# Patient Record
Sex: FEMALE | Race: WHITE | NOT HISPANIC OR LATINO | Employment: OTHER | URBAN - METROPOLITAN AREA
[De-identification: names, ages, dates, MRNs, and addresses within clinical notes are randomized per-mention and may not be internally consistent; named-entity substitution may affect disease eponyms.]

---

## 2018-08-06 DIAGNOSIS — E11.9 TYPE 2 DIABETES MELLITUS WITHOUT COMPLICATION, WITHOUT LONG-TERM CURRENT USE OF INSULIN (HCC): Primary | ICD-10-CM

## 2018-08-07 RX ORDER — DAPAGLIFLOZIN 10 MG/1
TABLET, FILM COATED ORAL
Qty: 30 TABLET | Refills: 3 | Status: SHIPPED | OUTPATIENT
Start: 2018-08-07

## 2018-08-21 ENCOUNTER — OFFICE VISIT (OUTPATIENT)
Dept: FAMILY MEDICINE CLINIC | Facility: CLINIC | Age: 48
End: 2018-08-21
Payer: COMMERCIAL

## 2018-08-21 VITALS
DIASTOLIC BLOOD PRESSURE: 76 MMHG | RESPIRATION RATE: 16 BRPM | HEIGHT: 60 IN | OXYGEN SATURATION: 100 % | TEMPERATURE: 98.2 F | BODY MASS INDEX: 31.53 KG/M2 | HEART RATE: 82 BPM | SYSTOLIC BLOOD PRESSURE: 116 MMHG | WEIGHT: 160.6 LBS

## 2018-08-21 DIAGNOSIS — E11.9 TYPE 2 DIABETES MELLITUS WITHOUT COMPLICATION, WITHOUT LONG-TERM CURRENT USE OF INSULIN (HCC): Primary | ICD-10-CM

## 2018-08-21 DIAGNOSIS — E03.8 OTHER SPECIFIED HYPOTHYROIDISM: ICD-10-CM

## 2018-08-21 DIAGNOSIS — E55.9 VITAMIN D INSUFFICIENCY: ICD-10-CM

## 2018-08-21 DIAGNOSIS — I10 ESSENTIAL HYPERTENSION: ICD-10-CM

## 2018-08-21 DIAGNOSIS — E78.49 OTHER HYPERLIPIDEMIA: ICD-10-CM

## 2018-08-21 PROCEDURE — 99214 OFFICE O/P EST MOD 30 MIN: CPT | Performed by: FAMILY MEDICINE

## 2018-08-21 RX ORDER — LEVOTHYROXINE SODIUM 0.07 MG/1
TABLET ORAL EVERY 24 HOURS
COMMUNITY
Start: 2018-02-20 | End: 2019-03-26 | Stop reason: SDUPTHER

## 2018-08-21 RX ORDER — SIMVASTATIN 20 MG
TABLET ORAL
COMMUNITY
Start: 2018-02-20 | End: 2019-05-11 | Stop reason: SDUPTHER

## 2018-08-21 RX ORDER — LISINOPRIL 10 MG/1
TABLET ORAL
COMMUNITY
Start: 2018-02-20 | End: 2019-04-12 | Stop reason: SDUPTHER

## 2018-08-21 RX ORDER — PAROXETINE HYDROCHLORIDE 20 MG/1
TABLET, FILM COATED ORAL
COMMUNITY
Start: 2018-02-20 | End: 2019-05-12 | Stop reason: SDUPTHER

## 2018-08-21 RX ORDER — ERGOCALCIFEROL (VITAMIN D2) 1250 MCG
CAPSULE ORAL
COMMUNITY
Start: 2018-03-21 | End: 2020-06-18 | Stop reason: SDUPTHER

## 2018-08-21 NOTE — PROGRESS NOTES
Assessment/Plan:     Diagnoses and all orders for this visit:    Type 2 diabetes mellitus without complication, without long-term current use of insulin (HCC)  Comments:   it was discussed about continue same medication and I will check hemoglobin A1c  I will consider adjusting her medications depend on the results  Orders:  -     Hemoglobin A1C; Future  -     Microalbumin / creatinine urine ratio    Other specified hypothyroidism  Comments:   continue same medication and we will continue to monitor  Orders:  -     TSH, 3rd generation with Free T4 reflex; Future    Essential hypertension  Comments:    Stable, continue same  Will continue to monitor  Orders:  -     CBC and differential; Future  -     Comprehensive metabolic panel; Future    Other hyperlipidemia  Comments:   continue same and we will continue to monitor  Orders:  -     Lipid Panel with Direct LDL reflex; Future    Vitamin D insufficiency  -     Vitamin D 25 hydroxy; Future    Other orders  -     econazole nitrate 1 % cream; Every 12 hours  -     ergocalciferol (ERGOCALCIFEROL) 80014 units capsule; take 1 capsule by oral route  every week  -     metFORMIN (GLUCOPHAGE) 1000 MG tablet; Every 12 hours  -     lisinopril (ZESTRIL) 10 mg tablet; Take 1 tablet by mouth daily  -     PARoxetine (PAXIL) 20 mg tablet; Take 1 tablet by mouth daily  -     levothyroxine (SYNTHROID) 75 mcg tablet; every 24 hours  -     simvastatin (ZOCOR) 20 mg tablet; Take 1 tablet by mouth at bedtime          There are no Patient Instructions on file for this visit  Return in about 6 months (around 2/21/2019)  Subjective:      Patient ID: Lorena Gómez is a 49 y o  female  Chief Complaint   Patient presents with    Hypertension     6 month f/u    Diabetes    Hyperlipidemia        She is here today for follow-up multiple medical problems  Denies any complain  She has been taking all her medications  She denies any side effects from her medications          The following portions of the patient's history were reviewed and updated as appropriate: allergies, current medications, past family history, past medical history, past social history, past surgical history and problem list     Review of Systems   Constitutional: Negative for chills and fever  HENT: Negative for trouble swallowing  Eyes: Negative for visual disturbance  Respiratory: Negative for cough and shortness of breath  Cardiovascular: Negative for chest pain, palpitations and leg swelling  Gastrointestinal: Negative for abdominal pain, constipation and diarrhea  Endocrine: Negative for cold intolerance and heat intolerance  Genitourinary: Negative for difficulty urinating and dysuria  Musculoskeletal: Negative for gait problem  Skin: Negative for rash  Neurological: Negative for dizziness, tremors, seizures and headaches  Hematological: Negative for adenopathy  Psychiatric/Behavioral: Negative for behavioral problems  Current Outpatient Prescriptions   Medication Sig Dispense Refill    ergocalciferol (ERGOCALCIFEROL) 08274 units capsule take 1 capsule by oral route  every week      FARXIGA 10 MG TABS take 1 tablet by mouth every morning 30 tablet 3    levothyroxine (SYNTHROID) 75 mcg tablet every 24 hours      lisinopril (ZESTRIL) 10 mg tablet Take 1 tablet by mouth daily      metFORMIN (GLUCOPHAGE) 1000 MG tablet Every 12 hours      PARoxetine (PAXIL) 20 mg tablet Take 1 tablet by mouth daily      simvastatin (ZOCOR) 20 mg tablet Take 1 tablet by mouth at bedtime      econazole nitrate 1 % cream Every 12 hours       No current facility-administered medications for this visit          Objective:    /76 (BP Location: Left arm, Patient Position: Sitting, Cuff Size: Adult)   Pulse 82   Temp 98 2 °F (36 8 °C) (Tympanic)   Resp 16   Ht 5' (1 524 m)   Wt 72 8 kg (160 lb 9 6 oz)   SpO2 100%   BMI 31 37 kg/m²        Physical Exam   Constitutional: She is oriented to person, place, and time  She appears well-developed and well-nourished  HENT:   Head: Normocephalic and atraumatic  Eyes: EOM are normal  Pupils are equal, round, and reactive to light  Neck: Normal range of motion  Neck supple  Cardiovascular: Normal rate, regular rhythm and normal heart sounds  Pulmonary/Chest: Effort normal and breath sounds normal    Abdominal: Soft  Bowel sounds are normal    Musculoskeletal: Normal range of motion  She exhibits no edema  Lymphadenopathy:     She has no cervical adenopathy  Neurological: She is alert and oriented to person, place, and time  No cranial nerve deficit  Skin: Skin is warm  Psychiatric: She has a normal mood and affect  Nursing note and vitals reviewed               Virginia Valerio MD

## 2019-03-02 DIAGNOSIS — E11.9 TYPE 2 DIABETES MELLITUS WITHOUT COMPLICATION, WITHOUT LONG-TERM CURRENT USE OF INSULIN (HCC): Primary | ICD-10-CM

## 2019-03-26 DIAGNOSIS — E03.8 OTHER SPECIFIED HYPOTHYROIDISM: Primary | ICD-10-CM

## 2019-03-26 RX ORDER — LEVOTHYROXINE SODIUM 0.07 MG/1
TABLET ORAL
Qty: 90 TABLET | Refills: 3 | Status: SHIPPED | OUTPATIENT
Start: 2019-03-26 | End: 2020-03-23 | Stop reason: SDUPTHER

## 2019-04-01 ENCOUNTER — TELEPHONE (OUTPATIENT)
Dept: FAMILY MEDICINE CLINIC | Facility: CLINIC | Age: 49
End: 2019-04-01

## 2019-04-06 ENCOUNTER — APPOINTMENT (OUTPATIENT)
Dept: LAB | Facility: IMAGING CENTER | Age: 49
End: 2019-04-06
Payer: COMMERCIAL

## 2019-04-06 ENCOUNTER — TRANSCRIBE ORDERS (OUTPATIENT)
Dept: ADMINISTRATIVE | Facility: HOSPITAL | Age: 49
End: 2019-04-06

## 2019-04-06 DIAGNOSIS — E11.9 TYPE 2 DIABETES MELLITUS WITHOUT COMPLICATION, WITHOUT LONG-TERM CURRENT USE OF INSULIN (HCC): ICD-10-CM

## 2019-04-06 DIAGNOSIS — E55.9 VITAMIN D INSUFFICIENCY: ICD-10-CM

## 2019-04-06 DIAGNOSIS — E78.49 OTHER HYPERLIPIDEMIA: ICD-10-CM

## 2019-04-06 DIAGNOSIS — E03.8 OTHER SPECIFIED HYPOTHYROIDISM: ICD-10-CM

## 2019-04-06 DIAGNOSIS — I10 ESSENTIAL HYPERTENSION: ICD-10-CM

## 2019-04-06 LAB
BASOPHILS # BLD AUTO: 0.03 THOUSANDS/ΜL (ref 0–0.1)
BASOPHILS NFR BLD AUTO: 0 % (ref 0–1)
EOSINOPHIL # BLD AUTO: 0.19 THOUSAND/ΜL (ref 0–0.61)
EOSINOPHIL NFR BLD AUTO: 2 % (ref 0–6)
ERYTHROCYTE [DISTWIDTH] IN BLOOD BY AUTOMATED COUNT: 13.9 % (ref 11.6–15.1)
HCT VFR BLD AUTO: 41.4 % (ref 34.8–46.1)
HGB BLD-MCNC: 12.8 G/DL (ref 11.5–15.4)
IMM GRANULOCYTES # BLD AUTO: 0.02 THOUSAND/UL (ref 0–0.2)
IMM GRANULOCYTES NFR BLD AUTO: 0 % (ref 0–2)
LYMPHOCYTES # BLD AUTO: 2.66 THOUSANDS/ΜL (ref 0.6–4.47)
LYMPHOCYTES NFR BLD AUTO: 28 % (ref 14–44)
MCH RBC QN AUTO: 28.5 PG (ref 26.8–34.3)
MCHC RBC AUTO-ENTMCNC: 30.9 G/DL (ref 31.4–37.4)
MCV RBC AUTO: 92 FL (ref 82–98)
MONOCYTES # BLD AUTO: 0.52 THOUSAND/ΜL (ref 0.17–1.22)
MONOCYTES NFR BLD AUTO: 6 % (ref 4–12)
NEUTROPHILS # BLD AUTO: 6.06 THOUSANDS/ΜL (ref 1.85–7.62)
NEUTS SEG NFR BLD AUTO: 64 % (ref 43–75)
NRBC BLD AUTO-RTO: 0 /100 WBCS
PLATELET # BLD AUTO: 222 THOUSANDS/UL (ref 149–390)
PMV BLD AUTO: 12.3 FL (ref 8.9–12.7)
RBC # BLD AUTO: 4.49 MILLION/UL (ref 3.81–5.12)
WBC # BLD AUTO: 9.48 THOUSAND/UL (ref 4.31–10.16)

## 2019-04-06 PROCEDURE — 83036 HEMOGLOBIN GLYCOSYLATED A1C: CPT

## 2019-04-06 PROCEDURE — 80061 LIPID PANEL: CPT

## 2019-04-06 PROCEDURE — 85025 COMPLETE CBC W/AUTO DIFF WBC: CPT

## 2019-04-06 PROCEDURE — 80053 COMPREHEN METABOLIC PANEL: CPT

## 2019-04-06 PROCEDURE — 82306 VITAMIN D 25 HYDROXY: CPT

## 2019-04-06 PROCEDURE — 36415 COLL VENOUS BLD VENIPUNCTURE: CPT

## 2019-04-06 PROCEDURE — 84443 ASSAY THYROID STIM HORMONE: CPT

## 2019-04-07 LAB
25(OH)D3 SERPL-MCNC: 29.7 NG/ML (ref 30–100)
ALBUMIN SERPL BCP-MCNC: 4.2 G/DL (ref 3.5–5)
ALP SERPL-CCNC: 121 U/L (ref 46–116)
ALT SERPL W P-5'-P-CCNC: 24 U/L (ref 12–78)
ANION GAP SERPL CALCULATED.3IONS-SCNC: 3 MMOL/L (ref 4–13)
AST SERPL W P-5'-P-CCNC: 19 U/L (ref 5–45)
BILIRUB SERPL-MCNC: 0.54 MG/DL (ref 0.2–1)
BUN SERPL-MCNC: 15 MG/DL (ref 5–25)
CALCIUM SERPL-MCNC: 8 MG/DL (ref 8.3–10.1)
CHLORIDE SERPL-SCNC: 102 MMOL/L (ref 100–108)
CHOLEST SERPL-MCNC: 114 MG/DL (ref 50–200)
CO2 SERPL-SCNC: 32 MMOL/L (ref 21–32)
CREAT SERPL-MCNC: 0.77 MG/DL (ref 0.6–1.3)
EST. AVERAGE GLUCOSE BLD GHB EST-MCNC: 137 MG/DL
GFR SERPL CREATININE-BSD FRML MDRD: 91 ML/MIN/1.73SQ M
GLUCOSE P FAST SERPL-MCNC: 164 MG/DL (ref 65–99)
HBA1C MFR BLD: 6.4 % (ref 4.2–6.3)
HDLC SERPL-MCNC: 40 MG/DL (ref 40–60)
LDLC SERPL CALC-MCNC: 49 MG/DL (ref 0–100)
POTASSIUM SERPL-SCNC: 4.2 MMOL/L (ref 3.5–5.3)
PROT SERPL-MCNC: 8.5 G/DL (ref 6.4–8.2)
SODIUM SERPL-SCNC: 137 MMOL/L (ref 136–145)
TRIGL SERPL-MCNC: 126 MG/DL
TSH SERPL DL<=0.05 MIU/L-ACNC: 2.31 UIU/ML (ref 0.36–3.74)

## 2019-04-12 DIAGNOSIS — I10 ESSENTIAL HYPERTENSION: Primary | ICD-10-CM

## 2019-04-14 RX ORDER — LISINOPRIL 10 MG/1
TABLET ORAL
Qty: 90 TABLET | Refills: 3 | Status: SHIPPED | OUTPATIENT
Start: 2019-04-14 | End: 2020-05-19 | Stop reason: SDUPTHER

## 2019-04-26 ENCOUNTER — OFFICE VISIT (OUTPATIENT)
Dept: FAMILY MEDICINE CLINIC | Facility: CLINIC | Age: 49
End: 2019-04-26
Payer: COMMERCIAL

## 2019-04-26 VITALS
HEIGHT: 60 IN | TEMPERATURE: 97 F | RESPIRATION RATE: 16 BRPM | SYSTOLIC BLOOD PRESSURE: 118 MMHG | BODY MASS INDEX: 31.02 KG/M2 | DIASTOLIC BLOOD PRESSURE: 76 MMHG | HEART RATE: 84 BPM | WEIGHT: 158 LBS | OXYGEN SATURATION: 95 %

## 2019-04-26 DIAGNOSIS — Z00.00 HEALTHCARE MAINTENANCE: ICD-10-CM

## 2019-04-26 DIAGNOSIS — Z12.11 ENCOUNTER FOR COLORECTAL CANCER SCREENING: ICD-10-CM

## 2019-04-26 DIAGNOSIS — Z12.12 ENCOUNTER FOR COLORECTAL CANCER SCREENING: ICD-10-CM

## 2019-04-26 DIAGNOSIS — E11.9 TYPE 2 DIABETES MELLITUS WITHOUT COMPLICATION, WITHOUT LONG-TERM CURRENT USE OF INSULIN (HCC): Primary | ICD-10-CM

## 2019-04-26 DIAGNOSIS — E78.49 OTHER HYPERLIPIDEMIA: ICD-10-CM

## 2019-04-26 DIAGNOSIS — E03.8 OTHER SPECIFIED HYPOTHYROIDISM: ICD-10-CM

## 2019-04-26 DIAGNOSIS — Z12.31 ENCOUNTER FOR SCREENING MAMMOGRAM FOR MALIGNANT NEOPLASM OF BREAST: ICD-10-CM

## 2019-04-26 DIAGNOSIS — E55.9 VITAMIN D INSUFFICIENCY: ICD-10-CM

## 2019-04-26 DIAGNOSIS — I10 ESSENTIAL HYPERTENSION: ICD-10-CM

## 2019-04-26 PROCEDURE — 99214 OFFICE O/P EST MOD 30 MIN: CPT | Performed by: FAMILY MEDICINE

## 2019-04-26 PROCEDURE — G0439 PPPS, SUBSEQ VISIT: HCPCS | Performed by: FAMILY MEDICINE

## 2019-05-11 DIAGNOSIS — E78.49 OTHER HYPERLIPIDEMIA: Primary | ICD-10-CM

## 2019-05-12 DIAGNOSIS — F41.9 ANXIETY: Primary | ICD-10-CM

## 2019-05-12 RX ORDER — PAROXETINE HYDROCHLORIDE 20 MG/1
TABLET, FILM COATED ORAL
Qty: 90 TABLET | Refills: 3 | Status: SHIPPED | OUTPATIENT
Start: 2019-05-12 | End: 2020-05-19 | Stop reason: SDUPTHER

## 2019-05-12 RX ORDER — SIMVASTATIN 20 MG
TABLET ORAL
Qty: 90 TABLET | Refills: 3 | Status: SHIPPED | OUTPATIENT
Start: 2019-05-12 | End: 2020-05-19 | Stop reason: SDUPTHER

## 2019-10-11 ENCOUNTER — OFFICE VISIT (OUTPATIENT)
Dept: FAMILY MEDICINE CLINIC | Facility: CLINIC | Age: 49
End: 2019-10-11
Payer: COMMERCIAL

## 2019-10-11 VITALS
DIASTOLIC BLOOD PRESSURE: 84 MMHG | HEIGHT: 60 IN | OXYGEN SATURATION: 95 % | HEART RATE: 77 BPM | WEIGHT: 157 LBS | BODY MASS INDEX: 30.82 KG/M2 | RESPIRATION RATE: 16 BRPM | SYSTOLIC BLOOD PRESSURE: 124 MMHG | TEMPERATURE: 98.5 F

## 2019-10-11 DIAGNOSIS — E03.8 OTHER SPECIFIED HYPOTHYROIDISM: ICD-10-CM

## 2019-10-11 DIAGNOSIS — F32.89 OTHER DEPRESSION: ICD-10-CM

## 2019-10-11 DIAGNOSIS — E78.49 OTHER HYPERLIPIDEMIA: ICD-10-CM

## 2019-10-11 DIAGNOSIS — I10 ESSENTIAL HYPERTENSION: ICD-10-CM

## 2019-10-11 DIAGNOSIS — E11.69 TYPE 2 DIABETES MELLITUS WITH OTHER SPECIFIED COMPLICATION, WITHOUT LONG-TERM CURRENT USE OF INSULIN (HCC): Primary | ICD-10-CM

## 2019-10-11 PROCEDURE — 99214 OFFICE O/P EST MOD 30 MIN: CPT | Performed by: FAMILY MEDICINE

## 2019-10-11 NOTE — ASSESSMENT & PLAN NOTE
Well controlled  Continue same  Will continue to monitor her A1c    Lab Results   Component Value Date    HGBA1C 6 4 (H) 04/06/2019

## 2019-10-11 NOTE — PROGRESS NOTES
Assessment/Plan:  Depression  Well controlled  Continue same  Will continue to monitor  Hyperlipidemia  Continue same  We will continue to monitor fasting lipid profile  Will continue to monitor  Essential hypertension  Well controlled  Continue same  Will continue to monitor  Diabetes mellitus ([address] )  Well controlled  Continue same  Will continue to monitor her A1c  Lab Results   Component Value Date    HGBA1C 6 4 (H) 04/06/2019       Hypothyroidism  Stable  Continue same  Will continue to monitor  Diagnoses and all orders for this visit:    Type 2 diabetes mellitus with other specified complication, without long-term current use of insulin (HCC)  -     Microalbumin / creatinine urine ratio    Essential hypertension    Other specified hypothyroidism    Other hyperlipidemia    Other depression          There are no Patient Instructions on file for this visit  Return in about 6 months (around 4/11/2020)  Subjective:      Patient ID: Chon Castellon is a 51 y o  female  Chief Complaint   Patient presents with    Diabetes    Hypertension    Hyperlipidemia    Depression       She is here today for follow-up multiple medical problems  She has been taking all her medications  She denies any side effects from her medications  She stays with her mom in apartment  Her mother also is a diabetic  Her sister stating that she is not sure if she is doing well with her diet  She does not monitor her sugar regularly  The following portions of the patient's history were reviewed and updated as appropriate: allergies, current medications, past family history, past medical history, past social history, past surgical history and problem list     Review of Systems   Constitutional: Negative for chills and fever  HENT: Negative for trouble swallowing  Eyes: Negative for visual disturbance  Respiratory: Negative for cough and shortness of breath      Cardiovascular: Negative for chest pain, palpitations and leg swelling  Gastrointestinal: Negative for abdominal pain, constipation and diarrhea  Endocrine: Negative for cold intolerance and heat intolerance  Genitourinary: Negative for difficulty urinating and dysuria  Musculoskeletal: Negative for gait problem  Skin: Negative for rash  Neurological: Negative for dizziness, tremors, seizures and headaches  Hematological: Negative for adenopathy  Psychiatric/Behavioral: Negative for behavioral problems  Current Outpatient Medications   Medication Sig Dispense Refill    econazole nitrate 1 % cream Every 12 hours      FARXIGA 10 MG TABS take 1 tablet by mouth every morning 30 tablet 3    levothyroxine 75 mcg tablet take 1 tablet by mouth once daily 90 tablet 3    lisinopril (ZESTRIL) 10 mg tablet take 1 tablet by mouth once daily 90 tablet 3    metFORMIN (GLUCOPHAGE) 1000 MG tablet TAKE 1 TABLET BY MOUTH 2 TIMES DAILY WITH MORNING AND EVENING MEALS 180 tablet 3    PARoxetine (PAXIL) 20 mg tablet take 1 tablet by mouth once daily 90 tablet 3    simvastatin (ZOCOR) 20 mg tablet take 1 tablet by mouth at bedtime 90 tablet 3    ergocalciferol (ERGOCALCIFEROL) 40061 units capsule take 1 capsule by oral route  every week       No current facility-administered medications for this visit  Objective:    /84 (BP Location: Left arm, Patient Position: Sitting, Cuff Size: Adult)   Pulse 77   Temp 98 5 °F (36 9 °C) (Tympanic)   Resp 16   Ht 5' (1 524 m)   Wt 71 2 kg (157 lb)   SpO2 95%   BMI 30 66 kg/m²        Physical Exam   Constitutional: She is oriented to person, place, and time  She appears well-developed and well-nourished  HENT:   Head: Normocephalic and atraumatic  Eyes: Pupils are equal, round, and reactive to light  EOM are normal    Neck: Normal range of motion  Neck supple  Cardiovascular: Normal rate, regular rhythm and normal heart sounds     Pulmonary/Chest: Effort normal and breath sounds normal    Abdominal: Soft  Bowel sounds are normal    Musculoskeletal: Normal range of motion  She exhibits no edema  Lymphadenopathy:     She has no cervical adenopathy  Neurological: She is alert and oriented to person, place, and time  No cranial nerve deficit  Skin: Skin is warm  Psychiatric: She has a normal mood and affect  Nursing note and vitals reviewed               Renetta Anguiano MD

## 2019-11-20 ENCOUNTER — TELEPHONE (OUTPATIENT)
Dept: FAMILY MEDICINE CLINIC | Facility: CLINIC | Age: 49
End: 2019-11-20

## 2020-02-17 ENCOUNTER — TELEPHONE (OUTPATIENT)
Dept: FAMILY MEDICINE CLINIC | Facility: CLINIC | Age: 50
End: 2020-02-17

## 2020-02-17 NOTE — TELEPHONE ENCOUNTER
I spoke with her sister jey she will call to have her scheduled at [facility] where she has been going for years   I advised her to make sure she has an actual dm eye exam  Pt sister agreed

## 2020-03-02 DIAGNOSIS — E11.9 TYPE 2 DIABETES MELLITUS WITHOUT COMPLICATION, WITHOUT LONG-TERM CURRENT USE OF INSULIN (HCC): ICD-10-CM

## 2020-03-23 DIAGNOSIS — E03.8 OTHER SPECIFIED HYPOTHYROIDISM: ICD-10-CM

## 2020-03-24 RX ORDER — LEVOTHYROXINE SODIUM 0.07 MG/1
75 TABLET ORAL DAILY
Qty: 90 TABLET | Refills: 0 | Status: SHIPPED | OUTPATIENT
Start: 2020-03-24 | End: 2020-05-19 | Stop reason: SDUPTHER

## 2020-03-24 NOTE — TELEPHONE ENCOUNTER
Pt last seen 10/11/19 sent to provider for approval  Additional refills of 3 were removed as she will be due for office visit   Please review

## 2020-05-19 ENCOUNTER — TELEMEDICINE (OUTPATIENT)
Dept: FAMILY MEDICINE CLINIC | Facility: CLINIC | Age: 50
End: 2020-05-19
Payer: COMMERCIAL

## 2020-05-19 VITALS
WEIGHT: 160 LBS | HEIGHT: 60 IN | DIASTOLIC BLOOD PRESSURE: 80 MMHG | SYSTOLIC BLOOD PRESSURE: 120 MMHG | BODY MASS INDEX: 31.41 KG/M2

## 2020-05-19 DIAGNOSIS — E03.8 OTHER SPECIFIED HYPOTHYROIDISM: ICD-10-CM

## 2020-05-19 DIAGNOSIS — F32.89 OTHER DEPRESSION: ICD-10-CM

## 2020-05-19 DIAGNOSIS — E78.49 OTHER HYPERLIPIDEMIA: ICD-10-CM

## 2020-05-19 DIAGNOSIS — E11.9 TYPE 2 DIABETES MELLITUS WITHOUT COMPLICATION, WITHOUT LONG-TERM CURRENT USE OF INSULIN (HCC): Primary | ICD-10-CM

## 2020-05-19 DIAGNOSIS — I10 ESSENTIAL HYPERTENSION: ICD-10-CM

## 2020-05-19 DIAGNOSIS — Z00.00 HEALTHCARE MAINTENANCE: ICD-10-CM

## 2020-05-19 DIAGNOSIS — E55.9 VITAMIN D INSUFFICIENCY: ICD-10-CM

## 2020-05-19 DIAGNOSIS — F41.9 ANXIETY: ICD-10-CM

## 2020-05-19 PROCEDURE — 99214 OFFICE O/P EST MOD 30 MIN: CPT | Performed by: FAMILY MEDICINE

## 2020-05-19 PROCEDURE — 1036F TOBACCO NON-USER: CPT | Performed by: FAMILY MEDICINE

## 2020-05-19 PROCEDURE — 3008F BODY MASS INDEX DOCD: CPT | Performed by: FAMILY MEDICINE

## 2020-05-19 PROCEDURE — 3079F DIAST BP 80-89 MM HG: CPT | Performed by: FAMILY MEDICINE

## 2020-05-19 PROCEDURE — 3074F SYST BP LT 130 MM HG: CPT | Performed by: FAMILY MEDICINE

## 2020-05-19 PROCEDURE — G0439 PPPS, SUBSEQ VISIT: HCPCS | Performed by: FAMILY MEDICINE

## 2020-05-19 PROCEDURE — 4010F ACE/ARB THERAPY RXD/TAKEN: CPT | Performed by: FAMILY MEDICINE

## 2020-05-19 RX ORDER — PAROXETINE HYDROCHLORIDE 20 MG/1
20 TABLET, FILM COATED ORAL DAILY
Qty: 90 TABLET | Refills: 3 | Status: SHIPPED | OUTPATIENT
Start: 2020-05-19 | End: 2021-07-23

## 2020-05-19 RX ORDER — LEVOTHYROXINE SODIUM 0.07 MG/1
75 TABLET ORAL DAILY
Qty: 90 TABLET | Refills: 0 | Status: SHIPPED | OUTPATIENT
Start: 2020-05-19 | End: 2020-08-17 | Stop reason: SDUPTHER

## 2020-05-19 RX ORDER — SIMVASTATIN 20 MG
20 TABLET ORAL
Qty: 90 TABLET | Refills: 3 | Status: SHIPPED | OUTPATIENT
Start: 2020-05-19 | End: 2021-07-23

## 2020-05-19 RX ORDER — LISINOPRIL 10 MG/1
10 TABLET ORAL DAILY
Qty: 90 TABLET | Refills: 3 | Status: SHIPPED | OUTPATIENT
Start: 2020-05-19 | End: 2021-07-23 | Stop reason: SDUPTHER

## 2020-05-20 ENCOUNTER — TELEPHONE (OUTPATIENT)
Dept: FAMILY MEDICINE CLINIC | Facility: CLINIC | Age: 50
End: 2020-05-20

## 2020-06-04 ENCOUNTER — TELEPHONE (OUTPATIENT)
Dept: FAMILY MEDICINE CLINIC | Facility: CLINIC | Age: 50
End: 2020-06-04

## 2020-06-09 LAB — HBA1C MFR BLD HPLC: 6.8 %

## 2020-06-18 ENCOUNTER — TELEPHONE (OUTPATIENT)
Dept: FAMILY MEDICINE CLINIC | Facility: CLINIC | Age: 50
End: 2020-06-18

## 2020-06-18 DIAGNOSIS — E55.9 VITAMIN D INSUFFICIENCY: Primary | ICD-10-CM

## 2020-06-18 RX ORDER — ERGOCALCIFEROL (VITAMIN D2) 1250 MCG
1 CAPSULE ORAL WEEKLY
Qty: 12 CAPSULE | Refills: 1 | Status: SHIPPED | OUTPATIENT
Start: 2020-06-18 | End: 2020-11-30 | Stop reason: SDUPTHER

## 2020-08-12 ENCOUNTER — TELEPHONE (OUTPATIENT)
Dept: FAMILY MEDICINE CLINIC | Facility: CLINIC | Age: 50
End: 2020-08-12

## 2020-08-12 NOTE — TELEPHONE ENCOUNTER
----- Message from Lizzeth Contreras sent at 8/11/2020 10:08 AM EDT -----  Regarding: schedule appt  Please call pt and schedule 3 month follow up per Dr Ilya Rodriguez

## 2020-08-17 DIAGNOSIS — E03.8 OTHER SPECIFIED HYPOTHYROIDISM: ICD-10-CM

## 2020-08-17 RX ORDER — LEVOTHYROXINE SODIUM 0.07 MG/1
75 TABLET ORAL DAILY
Qty: 90 TABLET | Refills: 0 | Status: SHIPPED | OUTPATIENT
Start: 2020-08-17 | End: 2020-10-30 | Stop reason: SDUPTHER

## 2020-10-30 ENCOUNTER — OFFICE VISIT (OUTPATIENT)
Dept: FAMILY MEDICINE CLINIC | Facility: CLINIC | Age: 50
End: 2020-10-30
Payer: COMMERCIAL

## 2020-10-30 VITALS
DIASTOLIC BLOOD PRESSURE: 74 MMHG | SYSTOLIC BLOOD PRESSURE: 128 MMHG | WEIGHT: 149 LBS | TEMPERATURE: 97.3 F | OXYGEN SATURATION: 98 % | RESPIRATION RATE: 16 BRPM | HEIGHT: 60 IN | BODY MASS INDEX: 29.25 KG/M2 | HEART RATE: 83 BPM

## 2020-10-30 DIAGNOSIS — F41.9 ANXIETY: ICD-10-CM

## 2020-10-30 DIAGNOSIS — E11.9 TYPE 2 DIABETES MELLITUS WITHOUT COMPLICATION, WITHOUT LONG-TERM CURRENT USE OF INSULIN (HCC): ICD-10-CM

## 2020-10-30 DIAGNOSIS — L30.9 DERMATITIS: Primary | ICD-10-CM

## 2020-10-30 DIAGNOSIS — F79 MENTAL DISABILITY: ICD-10-CM

## 2020-10-30 DIAGNOSIS — F32.89 OTHER DEPRESSION: ICD-10-CM

## 2020-10-30 DIAGNOSIS — I10 ESSENTIAL HYPERTENSION: ICD-10-CM

## 2020-10-30 DIAGNOSIS — E11.69 TYPE 2 DIABETES MELLITUS WITH OTHER SPECIFIED COMPLICATION, WITHOUT LONG-TERM CURRENT USE OF INSULIN (HCC): ICD-10-CM

## 2020-10-30 DIAGNOSIS — E03.8 OTHER SPECIFIED HYPOTHYROIDISM: ICD-10-CM

## 2020-10-30 DIAGNOSIS — E78.49 OTHER HYPERLIPIDEMIA: ICD-10-CM

## 2020-10-30 PROCEDURE — 99214 OFFICE O/P EST MOD 30 MIN: CPT | Performed by: FAMILY MEDICINE

## 2020-10-30 RX ORDER — LEVOTHYROXINE SODIUM 0.07 MG/1
75 TABLET ORAL DAILY
Qty: 90 TABLET | Refills: 1 | Status: SHIPPED | OUTPATIENT
Start: 2020-10-30 | End: 2021-04-19 | Stop reason: SDUPTHER

## 2020-10-30 RX ORDER — CLOTRIMAZOLE AND BETAMETHASONE DIPROPIONATE 10; .64 MG/G; MG/G
CREAM TOPICAL 2 TIMES DAILY
Qty: 45 G | Refills: 2 | Status: SHIPPED | OUTPATIENT
Start: 2020-10-30

## 2020-11-10 ENCOUNTER — VBI (OUTPATIENT)
Dept: ADMINISTRATIVE | Facility: OTHER | Age: 50
End: 2020-11-10

## 2020-11-30 DIAGNOSIS — E55.9 VITAMIN D INSUFFICIENCY: ICD-10-CM

## 2020-11-30 RX ORDER — ERGOCALCIFEROL (VITAMIN D2) 1250 MCG
1 CAPSULE ORAL WEEKLY
Qty: 12 CAPSULE | Refills: 1 | Status: SHIPPED | OUTPATIENT
Start: 2020-11-30

## 2020-12-10 ENCOUNTER — VBI (OUTPATIENT)
Dept: ADMINISTRATIVE | Facility: OTHER | Age: 50
End: 2020-12-10

## 2021-04-19 DIAGNOSIS — E03.8 OTHER SPECIFIED HYPOTHYROIDISM: ICD-10-CM

## 2021-04-19 RX ORDER — LEVOTHYROXINE SODIUM 0.07 MG/1
75 TABLET ORAL DAILY
Qty: 90 TABLET | Refills: 0 | Status: SHIPPED | OUTPATIENT
Start: 2021-04-19 | End: 2021-07-23

## 2021-04-19 NOTE — TELEPHONE ENCOUNTER
Pt last seen 10/30/21 and I did call for pt to schedule a visit and lm on [address] Since she is past due   Refill sent to provider for approval

## 2021-04-21 ENCOUNTER — VBI (OUTPATIENT)
Dept: ADMINISTRATIVE | Facility: OTHER | Age: 51
End: 2021-04-21

## 2021-04-27 ENCOUNTER — VBI (OUTPATIENT)
Dept: ADMINISTRATIVE | Facility: OTHER | Age: 51
End: 2021-04-27

## 2021-06-02 ENCOUNTER — VBI (OUTPATIENT)
Dept: ADMINISTRATIVE | Facility: OTHER | Age: 51
End: 2021-06-02

## 2021-06-29 ENCOUNTER — RA CDI HCC (OUTPATIENT)
Dept: OTHER | Facility: HOSPITAL | Age: 51
End: 2021-06-29

## 2021-06-29 NOTE — PROGRESS NOTES
[place] [id number]  coding opportunities        unspecified dx used     Chart reviewed, (number of) suggestions sent to provider: 2            Number of suggestions actually used: 1      Number of suggestions NOT actually used: 1     Patients insurance company: [employer] (Medicare Advantage and Commercial)     Visit status: Patient arrived for their scheduled appointment        [place] [address]  coding opportunities       DX: E11 9 Type 2 diabetes mellitus without complications    Can the depression be further specified as:     F32 0 major depressive disorder, single episode, mild  OR   F32 1 major depressive disorder, single episode, moderate  OR   F32 2 major depressive disorder, single episode, severe without psychotic features OR   F32 4 major depressive disorder, single episode, in partial remission OR   F32 5 major depressive disorder, single episode, in full remission        Chart reviewed, (number of) suggestions sent to provider: 2                  Patients insurance company: [address]  (Medicare Advantage and Commercial)

## 2021-07-02 ENCOUNTER — APPOINTMENT (OUTPATIENT)
Dept: LAB | Facility: IMAGING CENTER | Age: 51
End: 2021-07-02
Payer: COMMERCIAL

## 2021-07-02 DIAGNOSIS — E11.69 TYPE 2 DIABETES MELLITUS WITH OTHER SPECIFIED COMPLICATION, WITHOUT LONG-TERM CURRENT USE OF INSULIN (HCC): ICD-10-CM

## 2021-07-02 LAB
ALBUMIN SERPL BCP-MCNC: 4.6 G/DL (ref 3.5–5)
ALP SERPL-CCNC: 138 U/L (ref 46–116)
ALT SERPL W P-5'-P-CCNC: 32 U/L (ref 12–78)
ANION GAP SERPL CALCULATED.3IONS-SCNC: 8 MMOL/L (ref 4–13)
AST SERPL W P-5'-P-CCNC: 28 U/L (ref 5–45)
BASOPHILS # BLD AUTO: 0.04 THOUSANDS/ΜL (ref 0–0.1)
BASOPHILS NFR BLD AUTO: 1 % (ref 0–1)
BILIRUB SERPL-MCNC: 1.08 MG/DL (ref 0.2–1)
BUN SERPL-MCNC: 19 MG/DL (ref 5–25)
CALCIUM SERPL-MCNC: 8.9 MG/DL (ref 8.3–10.1)
CHLORIDE SERPL-SCNC: 100 MMOL/L (ref 100–108)
CHOLEST SERPL-MCNC: 140 MG/DL (ref 50–200)
CO2 SERPL-SCNC: 28 MMOL/L (ref 21–32)
CREAT SERPL-MCNC: 0.75 MG/DL (ref 0.6–1.3)
EOSINOPHIL # BLD AUTO: 0.15 THOUSAND/ΜL (ref 0–0.61)
EOSINOPHIL NFR BLD AUTO: 2 % (ref 0–6)
ERYTHROCYTE [DISTWIDTH] IN BLOOD BY AUTOMATED COUNT: 13.5 % (ref 11.6–15.1)
EST. AVERAGE GLUCOSE BLD GHB EST-MCNC: 126 MG/DL
GFR SERPL CREATININE-BSD FRML MDRD: 93 ML/MIN/1.73SQ M
GLUCOSE P FAST SERPL-MCNC: 75 MG/DL (ref 65–99)
HBA1C MFR BLD: 6 %
HCT VFR BLD AUTO: 45 % (ref 34.8–46.1)
HDLC SERPL-MCNC: 46 MG/DL
HGB BLD-MCNC: 14.2 G/DL (ref 11.5–15.4)
IMM GRANULOCYTES # BLD AUTO: 0.02 THOUSAND/UL (ref 0–0.2)
IMM GRANULOCYTES NFR BLD AUTO: 0 % (ref 0–2)
LDLC SERPL CALC-MCNC: 65 MG/DL (ref 0–100)
LYMPHOCYTES # BLD AUTO: 2.44 THOUSANDS/ΜL (ref 0.6–4.47)
LYMPHOCYTES NFR BLD AUTO: 29 % (ref 14–44)
MCH RBC QN AUTO: 29.3 PG (ref 26.8–34.3)
MCHC RBC AUTO-ENTMCNC: 31.6 G/DL (ref 31.4–37.4)
MCV RBC AUTO: 93 FL (ref 82–98)
MONOCYTES # BLD AUTO: 0.47 THOUSAND/ΜL (ref 0.17–1.22)
MONOCYTES NFR BLD AUTO: 6 % (ref 4–12)
NEUTROPHILS # BLD AUTO: 5.35 THOUSANDS/ΜL (ref 1.85–7.62)
NEUTS SEG NFR BLD AUTO: 62 % (ref 43–75)
NRBC BLD AUTO-RTO: 0 /100 WBCS
PLATELET # BLD AUTO: 233 THOUSANDS/UL (ref 149–390)
PMV BLD AUTO: 11.8 FL (ref 8.9–12.7)
POTASSIUM SERPL-SCNC: 4.1 MMOL/L (ref 3.5–5.3)
PROT SERPL-MCNC: 9.4 G/DL (ref 6.4–8.2)
RBC # BLD AUTO: 4.85 MILLION/UL (ref 3.81–5.12)
SODIUM SERPL-SCNC: 136 MMOL/L (ref 136–145)
TRIGL SERPL-MCNC: 146 MG/DL
TSH SERPL DL<=0.05 MIU/L-ACNC: 0.46 UIU/ML (ref 0.36–3.74)
WBC # BLD AUTO: 8.47 THOUSAND/UL (ref 4.31–10.16)

## 2021-07-02 PROCEDURE — 84443 ASSAY THYROID STIM HORMONE: CPT

## 2021-07-02 PROCEDURE — 36415 COLL VENOUS BLD VENIPUNCTURE: CPT

## 2021-07-02 PROCEDURE — 85025 COMPLETE CBC W/AUTO DIFF WBC: CPT

## 2021-07-02 PROCEDURE — 80053 COMPREHEN METABOLIC PANEL: CPT

## 2021-07-02 PROCEDURE — 80061 LIPID PANEL: CPT

## 2021-07-02 PROCEDURE — 83036 HEMOGLOBIN GLYCOSYLATED A1C: CPT

## 2021-07-06 ENCOUNTER — OFFICE VISIT (OUTPATIENT)
Dept: FAMILY MEDICINE CLINIC | Facility: CLINIC | Age: 51
End: 2021-07-06
Payer: COMMERCIAL

## 2021-07-06 VITALS
WEIGHT: 148.2 LBS | SYSTOLIC BLOOD PRESSURE: 106 MMHG | OXYGEN SATURATION: 99 % | HEART RATE: 99 BPM | TEMPERATURE: 97.6 F | HEIGHT: 60 IN | DIASTOLIC BLOOD PRESSURE: 66 MMHG | RESPIRATION RATE: 16 BRPM | BODY MASS INDEX: 29.09 KG/M2

## 2021-07-06 DIAGNOSIS — E78.49 OTHER HYPERLIPIDEMIA: ICD-10-CM

## 2021-07-06 DIAGNOSIS — F32.89 OTHER DEPRESSION: ICD-10-CM

## 2021-07-06 DIAGNOSIS — F79 MENTAL DISABILITY: ICD-10-CM

## 2021-07-06 DIAGNOSIS — E11.69 TYPE 2 DIABETES MELLITUS WITH OTHER SPECIFIED COMPLICATION, WITHOUT LONG-TERM CURRENT USE OF INSULIN (HCC): Primary | ICD-10-CM

## 2021-07-06 DIAGNOSIS — Z12.31 BREAST CANCER SCREENING BY MAMMOGRAM: ICD-10-CM

## 2021-07-06 DIAGNOSIS — Z00.00 HEALTHCARE MAINTENANCE: ICD-10-CM

## 2021-07-06 DIAGNOSIS — E03.8 OTHER SPECIFIED HYPOTHYROIDISM: ICD-10-CM

## 2021-07-06 DIAGNOSIS — I10 ESSENTIAL HYPERTENSION: ICD-10-CM

## 2021-07-06 DIAGNOSIS — Z12.11 COLON CANCER SCREENING: ICD-10-CM

## 2021-07-06 PROCEDURE — 99214 OFFICE O/P EST MOD 30 MIN: CPT | Performed by: FAMILY MEDICINE

## 2021-07-06 PROCEDURE — G0439 PPPS, SUBSEQ VISIT: HCPCS | Performed by: FAMILY MEDICINE

## 2021-07-06 NOTE — PROGRESS NOTES
Assessment/Plan:  Diabetes mellitus ([facility] [address] )    Well controlled  Continue same  Will continue to monitor  Continue to follow low carb diet  Lab Results   Component Value Date    HGBA1C 6 0 (H) 07/02/2021       Hypothyroidism    Stable  Continue same  Will continue to monitor  Essential hypertension    Well controlled  Continue same  Will continue to monitor  Depression    Stable  Continue same  Will continue to monitor  Hyperlipidemia    Stable  Continue same  Will continue to monitor  Mental disability    Stable  Continues to live with her mother  Healthcare maintenance    Discussed about immunizations, diet, exercise and safety measures  Diagnoses and all orders for this visit:    Type 2 diabetes mellitus with other specified complication, without long-term current use of insulin (HCC)  -     CBC and differential; Future  -     Comprehensive metabolic panel; Future  -     Hemoglobin A1C; Future  -     Lipid Panel with Direct LDL reflex; Future  -     TSH, 3rd generation with Free T4 reflex; Future  -     Microalbumin / creatinine urine ratio    Other specified hypothyroidism    Essential hypertension    Other depression    Other hyperlipidemia    Mental disability    Colon cancer screening  -     Ambulatory referral to General Surgery; Future    Breast cancer screening by mammogram  -     Mammo screening bilateral w 3d & cad; Future    Healthcare maintenance    Other orders  -     Cancel: Occult Blood, Fecal Immunochemical; Future          There are no Patient Instructions on file for this visit  Return in about 6 months (around 1/6/2022)  Subjective:      Patient ID: Dalia Rogers is a 45 y o  female  Chief Complaint   Patient presents with   [facility] Visit       She is here today for follow-up multiple medical problems  She has been taking her medications  Denies any side effects from medications    She had blood work done recently showed A1c well controlled at 6  All the rest blood work came back stable  She denies any complaint today  The following portions of the patient's history were reviewed and updated as appropriate: allergies, current medications, past family history, past medical history, past social history, past surgical history and problem list     Review of Systems   Constitutional: Negative for chills and fever  HENT: Negative for trouble swallowing  Eyes: Negative for visual disturbance  Respiratory: Negative for cough and shortness of breath  Cardiovascular: Negative for chest pain, palpitations and leg swelling  Gastrointestinal: Negative for abdominal pain, constipation and diarrhea  Endocrine: Negative for cold intolerance and heat intolerance  Genitourinary: Negative for difficulty urinating and dysuria  Musculoskeletal: Negative for gait problem  Skin: Negative for rash  Neurological: Negative for dizziness, tremors, seizures and headaches  Hematological: Negative for adenopathy  Psychiatric/Behavioral: Negative for behavioral problems           Current Outpatient Medications   Medication Sig Dispense Refill    clotrimazole-betamethasone (LOTRISONE) 1-0 05 % cream Apply topically 2 (two) times a day 45 g 2    econazole nitrate 1 % cream Every 12 hours      FARXIGA 10 MG TABS take 1 tablet by mouth every morning 30 tablet 3    levothyroxine 75 mcg tablet Take 1 tablet (75 mcg total) by mouth daily 90 tablet 0    lisinopril (ZESTRIL) 10 mg tablet Take 1 tablet (10 mg total) by mouth daily 90 tablet 3    metFORMIN (GLUCOPHAGE) 1000 MG tablet Take 1 tablet (1,000 mg total) by mouth 2 (two) times a day with meals 180 tablet 3    PARoxetine (PAXIL) 20 mg tablet Take 1 tablet (20 mg total) by mouth daily 90 tablet 3    simvastatin (ZOCOR) 20 mg tablet Take 1 tablet (20 mg total) by mouth daily at bedtime 90 tablet 3    ergocalciferol (ERGOCALCIFEROL) 1 25 MG (27394 UT) capsule Take 1 capsule (50,000 Units total) by mouth once a week (Patient not taking: Reported on 7/6/2021) 12 capsule 1     No current facility-administered medications for this visit  Objective:    /66 (BP Location: Left arm, Patient Position: Sitting, Cuff Size: Standard)   Pulse 99   Temp 97 6 °F (36 4 °C) (Tympanic)   Resp 16   Ht 5' (1 524 m)   Wt 67 2 kg (148 lb 3 2 oz)   SpO2 99%   BMI 28 94 kg/m²        Physical Exam  Vitals and nursing note reviewed  Constitutional:       Appearance: Normal appearance  She is well-developed  HENT:      Head: Normocephalic and atraumatic  Eyes:      Pupils: Pupils are equal, round, and reactive to light  Cardiovascular:      Rate and Rhythm: Normal rate and regular rhythm  Heart sounds: Normal heart sounds  Pulmonary:      Effort: Pulmonary effort is normal       Breath sounds: Normal breath sounds  Abdominal:      General: Bowel sounds are normal       Palpations: Abdomen is soft  Musculoskeletal:         General: Normal range of motion  Cervical back: Normal range of motion and neck supple  Lymphadenopathy:      Cervical: No cervical adenopathy  Skin:     General: Skin is warm  Neurological:      Mental Status: She is alert and oriented to person, place, and time  Cranial Nerves: No cranial nerve deficit  Jordan Shah MD BMI Counseling: Body mass index is 28 94 kg/m²  The BMI is above normal  Nutrition recommendations include reducing portion sizes, decreasing overall calorie intake and 3-5 servings of fruits/vegetables daily  Exercise recommendations include moderate aerobic physical activity for 150 minutes/week

## 2021-07-06 NOTE — ASSESSMENT & PLAN NOTE
Well controlled  Continue same  Will continue to monitor  Continue to follow low carb diet    Lab Results   Component Value Date    HGBA1C 6 0 (H) 07/02/2021

## 2021-07-06 NOTE — PROGRESS NOTES
Assessment and Plan:     Problem List Items Addressed This Visit        Endocrine    Diabetes mellitus ([facility] ) - Primary       Well controlled  Continue same  Will continue to monitor  Continue to follow low carb diet  Lab Results   Component Value Date    HGBA1C 6 0 (H) 07/02/2021            Relevant Orders    CBC and differential    Comprehensive metabolic panel    Hemoglobin A1C    Lipid Panel with Direct LDL reflex    TSH, 3rd generation with Free T4 reflex    Microalbumin / creatinine urine ratio    Hypothyroidism       Stable  Continue same  Will continue to monitor  Cardiovascular and Mediastinum    Essential hypertension       Well controlled  Continue same  Will continue to monitor  Other    Depression       Stable  Continue same  Will continue to monitor  Hyperlipidemia       Stable  Continue same  Will continue to monitor  Mental disability       Stable  Continues to live with her mother  Healthcare maintenance       Discussed about immunizations, diet, exercise and safety measures  Other Visit Diagnoses     Colon cancer screening        Relevant Orders    Ambulatory referral to General Surgery    Breast cancer screening by mammogram        Relevant Orders    Mammo screening bilateral w 3d & cad    BMI 28 0-28 9,adult            BMI Counseling: Body mass index is 28 94 kg/m²  The BMI is above normal  Nutrition recommendations include decreasing portion sizes, encouraging healthy choices of fruits and vegetables and decreasing fast food intake  Preventive health issues were discussed with patient, and age appropriate screening tests were ordered as noted in patient's After Visit Summary  Personalized health advice and appropriate referrals for health education or preventive services given if needed, as noted in patient's After Visit Summary       History of Present Illness:     Patient presents for [facility] Annual Wellness visit    Patient Care Team:  Myranda Hughes MD as PCP - General (Family Medicine)     Problem List:     Patient Active Problem List   Diagnosis    Depression    [facility]    Essential hypertension    Hyperlipidemia    Hypothyroidism    Mental disability    Obesity    Healthcare maintenance      Past Medical and Surgical History:     Past Medical History:   Diagnosis Date    [facility] mellitus [facility]     Disease of thyroid gland     Hyperlipidemia     Hypertension     Seizures ([facility] )     h/o, no longer on meds    Tooth decay      History reviewed  No pertinent surgical history  Family History:     Family History   Problem Relation Age of Onset   Radha Puentes Cancer Mother         unknown    Heart attack Maternal Grandfather       Social History:     Social History     Socioeconomic History    Marital status: Single     Spouse name: None    Number of children: None    Years of education: None    Highest education level: None   Occupational History    None   Tobacco Use    Smoking status: Never Smoker    Smokeless tobacco: Never Used    Tobacco comment: No passive smoke exposure   Vaping Use    Vaping Use: Never used   Substance and Sexual Activity    Alcohol use: None    Drug use: No    Sexual activity: None   Other Topics Concern    None   Social History Narrative    None     Social Determinants of Health     Financial Resource Strain:     Difficulty of Paying Living Expenses:    Food Insecurity:     Worried About Running Out of Food in the Last Year:     Ran Out of Food in the Last Year:    Transportation Needs:     Lack of Transportation (Medical):      Lack of Transportation (Non-Medical):    Physical Activity:     Days of Exercise per Week:     Minutes of Exercise per Session:    Stress:     Feeling of Stress :    Social Connections:     Frequency of Communication with Friends and Family:     Frequency of Social Gatherings with Friends and Family:     Attends Hindu Services:     Active Member of Clubs or Organizations:     Attends Club or Organization Meetings:     Marital Status:    Intimate Partner Violence:     Fear of Current or Ex-Partner:     Emotionally Abused:     Physically Abused:     Sexually Abused:       Medications and Allergies:     Current Outpatient Medications   Medication Sig Dispense Refill    clotrimazole-betamethasone (LOTRISONE) 1-0 05 % cream Apply topically 2 (two) times a day 45 g 2    econazole nitrate 1 % cream Every 12 hours      FARXIGA 10 MG TABS take 1 tablet by mouth every morning 30 tablet 3    levothyroxine 75 mcg tablet Take 1 tablet (75 mcg total) by mouth daily 90 tablet 0    lisinopril (ZESTRIL) 10 mg tablet Take 1 tablet (10 mg total) by mouth daily 90 tablet 3    metFORMIN (GLUCOPHAGE) 1000 MG tablet Take 1 tablet (1,000 mg total) by mouth 2 (two) times a day with meals 180 tablet 3    PARoxetine (PAXIL) 20 mg tablet Take 1 tablet (20 mg total) by mouth daily 90 tablet 3    simvastatin (ZOCOR) 20 mg tablet Take 1 tablet (20 mg total) by mouth daily at bedtime 90 tablet 3    ergocalciferol (ERGOCALCIFEROL) 1 25 MG (20493 UT) capsule Take 1 capsule (50,000 Units total) by mouth once a week (Patient not taking: Reported on 7/6/2021) 12 capsule 1     No current facility-administered medications for this visit       Allergies   Allergen Reactions    No Known Allergies       Immunizations:     Immunization History   Administered Date(s) Administered    INFLUENZA 08/08/2014, 08/27/2015, 11/01/2016, 09/12/2020    Influenza Split 02/01/2013    Influenza, injectable, quadrivalent, preservative free 0 5 mL 09/12/2020    Influenza, seasonal, injectable 08/27/2015    Influenza, seasonal, injectable, preservative free 09/16/2013    Pneumococcal Polysaccharide PPV23 02/01/2013    Tdap 11/01/2016      Health Maintenance:         Topic Date Due    Hepatitis C Screening  Never done    HIV Screening  Never done    Cervical Cancer Screening  Never done    MAMMOGRAM  03/07/2015    Colorectal Cancer Screening  Never done         Topic Date Due    COVID-19 Vaccine (1) Never done    Influenza Vaccine (1) 09/01/2021      Medicare Health Risk Assessment:     /66 (BP Location: Left arm, Patient Position: Sitting, Cuff Size: Standard)   Pulse 99   Temp 97 6 °F (36 4 °C) (Tympanic)   Resp 16   Ht 5' (1 524 m)   Wt 67 2 kg (148 lb 3 2 oz)   SpO2 99%   BMI 28 94 kg/m²      Long Quick is here for her Subsequent Wellness visit  Health Risk Assessment:   Patient rates overall health as good  Patient feels that their physical health rating is same  Patient is satisfied with their life  Eyesight was rated as same  Hearing was rated as much worse  Patient feels that their emotional and mental health rating is same  Patients states they are never, rarely angry  Patient states they are never, rarely unusually tired/fatigued  Pain experienced in the last 7 days has been none  Patient states that she has experienced no weight loss or gain in last 6 months  Depression Screening:   PHQ-2 Score: 0  PHQ-9 Score: 3      Fall Risk Screening: In the past year, patient has experienced: no history of falling in past year      Urinary Incontinence Screening:   Patient has leaked urine accidently in the last six months  Home Safety:  Patient does not have trouble with stairs inside or outside of their home  Patient has working smoke alarms and has no working carbon monoxide detector  Home safety hazards include: none  Nutrition:   Current diet is Regular  Medications:   Patient is not currently taking any over-the-counter supplements  Patient is not able to manage medications  Activities of Daily Living (ADLs)/Instrumental Activities of Daily Living (IADLs):   Walk and transfer into and out of bed and chair?: Yes  Dress and groom yourself?: Yes    Bathe or shower yourself?: Yes    Feed yourself?  Yes  Do your laundry/housekeeping?: No  Manage your money, pay your bills and track your expenses?: No  Make your own meals?: Yes    Do your own shopping?: No    Previous Hospitalizations:   Any hospitalizations or ED visits within the last 12 months?: No      Advance Care Planning:   Living will: No    Durable POA for healthcare: No    Advanced directive: No      Cognitive Screening:   Provider or family/friend/caregiver concerned regarding cognition?: No    PREVENTIVE SCREENINGS      Cardiovascular Screening:    General: Screening Not Indicated and History Lipid Disorder      Diabetes Screening:     General: Screening Not Indicated and History Diabetes      Colorectal Cancer Screening:     General: Risks and Benefits Discussed    Due for: Colonoscopy - Low Risk      Breast Cancer Screening:     General: Risks and Benefits Discussed    Due for: Mammogram        Cervical Cancer Screening:    General: Risks and Benefits Discussed and Patient Declines      Osteoporosis Screening:    General: Screening Not Indicated      Abdominal Aortic Aneurysm (AAA) Screening:        General: Screening Not Indicated      Lung Cancer Screening:     General: Screening Not Indicated      Hepatitis C Screening:    General: Risks and Benefits Discussed    Screening, Brief Intervention, and Referral to Treatment (SBIRT)    Screening  Typical number of drinks in a day: 0  Typical number of drinks in a week: 0  Interpretation: Low risk drinking behavior  Brief Intervention  Alcohol & drug use screenings were reviewed  No concerns regarding substance use disorder identified         Leigh Iraheta MD

## 2021-07-09 ENCOUNTER — VBI (OUTPATIENT)
Dept: ADMINISTRATIVE | Facility: OTHER | Age: 51
End: 2021-07-09

## 2021-07-13 ENCOUNTER — VBI (OUTPATIENT)
Dept: ADMINISTRATIVE | Facility: OTHER | Age: 51
End: 2021-07-13

## 2021-07-23 DIAGNOSIS — I10 ESSENTIAL HYPERTENSION: ICD-10-CM

## 2021-07-23 DIAGNOSIS — E11.9 TYPE 2 DIABETES MELLITUS WITHOUT COMPLICATION, WITHOUT LONG-TERM CURRENT USE OF INSULIN (HCC): ICD-10-CM

## 2021-07-23 DIAGNOSIS — F41.9 ANXIETY: ICD-10-CM

## 2021-07-23 DIAGNOSIS — E03.8 OTHER SPECIFIED HYPOTHYROIDISM: ICD-10-CM

## 2021-07-23 DIAGNOSIS — E78.49 OTHER HYPERLIPIDEMIA: ICD-10-CM

## 2021-07-23 RX ORDER — PAROXETINE HYDROCHLORIDE 20 MG/1
TABLET, FILM COATED ORAL
Qty: 90 TABLET | Refills: 1 | Status: SHIPPED | OUTPATIENT
Start: 2021-07-23 | End: 2021-11-11

## 2021-07-23 RX ORDER — SIMVASTATIN 20 MG
TABLET ORAL
Qty: 90 TABLET | Refills: 1 | Status: SHIPPED | OUTPATIENT
Start: 2021-07-23 | End: 2022-07-22 | Stop reason: SDUPTHER

## 2021-07-23 RX ORDER — LEVOTHYROXINE SODIUM 0.07 MG/1
TABLET ORAL
Qty: 90 TABLET | Refills: 0 | Status: SHIPPED | OUTPATIENT
Start: 2021-07-23 | End: 2021-11-01

## 2021-07-23 RX ORDER — LISINOPRIL 10 MG/1
10 TABLET ORAL DAILY
Qty: 90 TABLET | Refills: 3 | Status: SHIPPED | OUTPATIENT
Start: 2021-07-23 | End: 2022-05-06 | Stop reason: SDUPTHER

## 2021-08-17 ENCOUNTER — VBI (OUTPATIENT)
Dept: ADMINISTRATIVE | Facility: OTHER | Age: 51
End: 2021-08-17

## 2021-09-03 ENCOUNTER — TELEPHONE (OUTPATIENT)
Dept: FAMILY MEDICINE CLINIC | Facility: CLINIC | Age: 51
End: 2021-09-03

## 2021-09-14 ENCOUNTER — VBI (OUTPATIENT)
Dept: ADMINISTRATIVE | Facility: OTHER | Age: 51
End: 2021-09-14

## 2021-09-30 ENCOUNTER — VBI (OUTPATIENT)
Dept: ADMINISTRATIVE | Facility: OTHER | Age: 51
End: 2021-09-30

## 2021-10-05 ENCOUNTER — VBI (OUTPATIENT)
Dept: ADMINISTRATIVE | Facility: OTHER | Age: 51
End: 2021-10-05

## 2021-10-06 ENCOUNTER — VBI (OUTPATIENT)
Dept: ADMINISTRATIVE | Facility: OTHER | Age: 51
End: 2021-10-06

## 2021-10-08 ENCOUNTER — VBI (OUTPATIENT)
Dept: ADMINISTRATIVE | Facility: OTHER | Age: 51
End: 2021-10-08

## 2021-10-26 ENCOUNTER — VBI (OUTPATIENT)
Dept: ADMINISTRATIVE | Facility: OTHER | Age: 51
End: 2021-10-26

## 2021-10-30 DIAGNOSIS — E03.8 OTHER SPECIFIED HYPOTHYROIDISM: ICD-10-CM

## 2021-11-01 RX ORDER — LEVOTHYROXINE SODIUM 0.07 MG/1
TABLET ORAL
Qty: 90 TABLET | Refills: 0 | Status: SHIPPED | OUTPATIENT
Start: 2021-11-01 | End: 2022-07-22 | Stop reason: SDUPTHER

## 2021-11-10 ENCOUNTER — VBI (OUTPATIENT)
Dept: ADMINISTRATIVE | Facility: OTHER | Age: 51
End: 2021-11-10

## 2021-11-11 DIAGNOSIS — F41.9 ANXIETY: ICD-10-CM

## 2021-11-11 RX ORDER — PAROXETINE HYDROCHLORIDE 20 MG/1
TABLET, FILM COATED ORAL
Qty: 90 TABLET | Refills: 0 | Status: SHIPPED | OUTPATIENT
Start: 2021-11-11 | End: 2022-05-06 | Stop reason: SDUPTHER

## 2021-12-10 ENCOUNTER — VBI (OUTPATIENT)
Dept: ADMINISTRATIVE | Facility: OTHER | Age: 51
End: 2021-12-10

## 2021-12-18 LAB
LEFT EYE DIABETIC RETINOPATHY: NORMAL
RIGHT EYE DIABETIC RETINOPATHY: NORMAL
SEVERITY (EYE EXAM): NORMAL

## 2022-05-06 DIAGNOSIS — F41.9 ANXIETY: ICD-10-CM

## 2022-05-06 DIAGNOSIS — I10 ESSENTIAL HYPERTENSION: ICD-10-CM

## 2022-05-06 DIAGNOSIS — E11.9 TYPE 2 DIABETES MELLITUS WITHOUT COMPLICATION, WITHOUT LONG-TERM CURRENT USE OF INSULIN (HCC): ICD-10-CM

## 2022-05-06 RX ORDER — PAROXETINE HYDROCHLORIDE 20 MG/1
20 TABLET, FILM COATED ORAL DAILY
Qty: 30 TABLET | Refills: 0 | Status: SHIPPED | OUTPATIENT
Start: 2022-05-06

## 2022-05-06 RX ORDER — LISINOPRIL 10 MG/1
10 TABLET ORAL DAILY
Qty: 30 TABLET | Refills: 0 | Status: SHIPPED | OUTPATIENT
Start: 2022-05-06 | End: 2022-06-20

## 2022-05-06 NOTE — TELEPHONE ENCOUNTER
Pt is overdue for a 6mo followup but scheduled for may 27 but needs these medication refilled to get her to her appt    paroxetine, metformin, and lisinopril    [facility] in [place]

## 2022-05-13 ENCOUNTER — VBI (OUTPATIENT)
Dept: ADMINISTRATIVE | Facility: OTHER | Age: 52
End: 2022-05-13

## 2022-06-17 ENCOUNTER — OFFICE VISIT (OUTPATIENT)
Dept: FAMILY MEDICINE CLINIC | Facility: CLINIC | Age: 52
End: 2022-06-17
Payer: COMMERCIAL

## 2022-06-17 VITALS
HEART RATE: 96 BPM | OXYGEN SATURATION: 98 % | TEMPERATURE: 98.8 F | WEIGHT: 134 LBS | SYSTOLIC BLOOD PRESSURE: 102 MMHG | RESPIRATION RATE: 14 BRPM | HEIGHT: 60 IN | BODY MASS INDEX: 26.31 KG/M2 | DIASTOLIC BLOOD PRESSURE: 62 MMHG

## 2022-06-17 DIAGNOSIS — E11.69 TYPE 2 DIABETES MELLITUS WITH OTHER SPECIFIED COMPLICATION, WITHOUT LONG-TERM CURRENT USE OF INSULIN (HCC): ICD-10-CM

## 2022-06-17 DIAGNOSIS — F32.89 OTHER DEPRESSION: ICD-10-CM

## 2022-06-17 DIAGNOSIS — E78.49 OTHER HYPERLIPIDEMIA: ICD-10-CM

## 2022-06-17 DIAGNOSIS — I10 ESSENTIAL HYPERTENSION: ICD-10-CM

## 2022-06-17 DIAGNOSIS — E03.8 OTHER SPECIFIED HYPOTHYROIDISM: ICD-10-CM

## 2022-06-17 DIAGNOSIS — F79 MENTAL DISABILITY: ICD-10-CM

## 2022-06-17 DIAGNOSIS — E11.9 TYPE 2 DIABETES MELLITUS WITHOUT COMPLICATION, WITHOUT LONG-TERM CURRENT USE OF INSULIN (HCC): Primary | ICD-10-CM

## 2022-06-17 LAB — SL AMB POCT HEMOGLOBIN AIC: 5.9 (ref ?–6.5)

## 2022-06-17 PROCEDURE — 83036 HEMOGLOBIN GLYCOSYLATED A1C: CPT | Performed by: FAMILY MEDICINE

## 2022-06-17 PROCEDURE — 3044F HG A1C LEVEL LT 7.0%: CPT | Performed by: FAMILY MEDICINE

## 2022-06-17 PROCEDURE — 3078F DIAST BP <80 MM HG: CPT | Performed by: FAMILY MEDICINE

## 2022-06-17 PROCEDURE — 3074F SYST BP LT 130 MM HG: CPT | Performed by: FAMILY MEDICINE

## 2022-06-17 PROCEDURE — 99214 OFFICE O/P EST MOD 30 MIN: CPT | Performed by: FAMILY MEDICINE

## 2022-06-17 PROCEDURE — 1003F LEVEL OF ACTIVITY ASSESS: CPT | Performed by: FAMILY MEDICINE

## 2022-06-17 PROCEDURE — 3008F BODY MASS INDEX DOCD: CPT | Performed by: FAMILY MEDICINE

## 2022-06-17 NOTE — ASSESSMENT & PLAN NOTE
She is due for blood work  Her last TSH was stable  Continue same  Will continue to monitor her TSH  She is to get her blood work done

## 2022-06-17 NOTE — ASSESSMENT & PLAN NOTE
Lab Results   Component Value Date    HGBA1C 5 9 06/17/2022   Well controlled  Continue same  Will continue to monitor her A1c  Discussed about low carb diet and regular exercise

## 2022-06-17 NOTE — ASSESSMENT & PLAN NOTE
Stable  Continue on statin  Continue to follow low-fat diet and regular exercise  Will continue to monitor

## 2022-06-18 DIAGNOSIS — I10 ESSENTIAL HYPERTENSION: ICD-10-CM

## 2022-06-18 DIAGNOSIS — E11.9 TYPE 2 DIABETES MELLITUS WITHOUT COMPLICATION, WITHOUT LONG-TERM CURRENT USE OF INSULIN (HCC): ICD-10-CM

## 2022-06-20 ENCOUNTER — TELEPHONE (OUTPATIENT)
Dept: ADMINISTRATIVE | Facility: OTHER | Age: 52
End: 2022-06-20

## 2022-06-20 ENCOUNTER — TELEPHONE (OUTPATIENT)
Dept: FAMILY MEDICINE CLINIC | Facility: CLINIC | Age: 52
End: 2022-06-20

## 2022-06-20 DIAGNOSIS — E11.9 TYPE 2 DIABETES MELLITUS WITHOUT COMPLICATION, WITHOUT LONG-TERM CURRENT USE OF INSULIN (HCC): ICD-10-CM

## 2022-06-20 DIAGNOSIS — I10 ESSENTIAL HYPERTENSION: ICD-10-CM

## 2022-06-20 PROCEDURE — 4010F ACE/ARB THERAPY RXD/TAKEN: CPT | Performed by: FAMILY MEDICINE

## 2022-06-20 RX ORDER — LISINOPRIL 10 MG/1
10 TABLET ORAL DAILY
Qty: 100 TABLET | Refills: 1 | Status: SHIPPED | OUTPATIENT
Start: 2022-06-20

## 2022-06-20 RX ORDER — LISINOPRIL 10 MG/1
TABLET ORAL
Qty: 90 TABLET | Refills: 1 | Status: SHIPPED | OUTPATIENT
Start: 2022-06-20 | End: 2022-06-20 | Stop reason: SDUPTHER

## 2022-06-20 NOTE — TELEPHONE ENCOUNTER
----- Message from Shell Blanton sent at 6/17/2022  3:14 PM EDT -----  Regarding: DM EYE EXAM  06/17/22 3:15 PM    Hello, our patient [facility] has had DIABETIC EYE EXAM completed/performed   Please assist in updating the patient chart by [address] The date of service is MAY 2023    Thank you,  Shell Blanton  [facility] [address]

## 2022-06-20 NOTE — LETTER
Diabetic Eye Exam Form    Date Requested: 22  Patient: Amy Elam  Patient : 1970   Referring Provider: Margaret Torres MD    DIABETIC Eye Exam Date _______________________________    Type of Exam MUST be documented for Diabetic Eye Exams  Please CHECK ONE  Retinal Exam       Dilated Retinal Exam       OCT       Optomap-Iris Exam      Fundus Photography     Left Eye - Please check Retinopathy AND Type or No Retinopathy      Exam did show retinopathy    Exam did not show retinopathy         Mild     Proliferative           Moderate    Severe            None         Right Eye - Please check Retinopathy AND Type or No Retinopathy     Exam did show retinopathy    Exam did not show retinopathy         Mild     Proliferative        Moderate    Severe        None       Comments DOS: 2022    Practice Providing Exam ______________________________________________    Exam Performed By (print name) _______________________________________      Provider Signature ___________________________________________________    These reports are needed for  compliance  Please fax this completed form and a copy of the Diabetic Eye Exam report to our office located at [place] [address] as soon as possible via [phone] robert [employer]: Phone [phone]  We thank you for your assistance in treating our mutual patient

## 2022-06-20 NOTE — TELEPHONE ENCOUNTER
Pharmacy requesting refill on lisinopril and metformin   Last seen 6/17/22  Medication sent to pharmacy

## 2022-06-20 NOTE — TELEPHONE ENCOUNTER
[facility] requesting [facility] 100 day supply  For  Metformin and Lisinopril  When I questioned the amount pharmacist said insurance will pay for this amount  Also asking if patient should be on a statin, per insurance

## 2022-06-21 NOTE — TELEPHONE ENCOUNTER
Upon review of the In Basket request and the patient's chart, initial outreach has been made via fax, please see Contacts section for details       Thank you  Tiana Lea

## 2022-06-28 NOTE — TELEPHONE ENCOUNTER
Upon review of the In Basket request we were able to locate, review, and update the patient chart as requested for Diabetic Eye Exam     Any additional questions or concerns should be emailed to the Practice Liaisons via [email or web address] email, please do not reply via In Basket      Thank you  Sheryl Christianson

## 2022-07-22 DIAGNOSIS — E11.9 TYPE 2 DIABETES MELLITUS WITHOUT COMPLICATION, WITHOUT LONG-TERM CURRENT USE OF INSULIN (HCC): ICD-10-CM

## 2022-07-22 DIAGNOSIS — E78.49 OTHER HYPERLIPIDEMIA: ICD-10-CM

## 2022-07-22 DIAGNOSIS — E03.8 OTHER SPECIFIED HYPOTHYROIDISM: ICD-10-CM

## 2022-07-25 ENCOUNTER — TELEPHONE (OUTPATIENT)
Dept: FAMILY MEDICINE CLINIC | Facility: CLINIC | Age: 52
End: 2022-07-25

## 2022-07-25 DIAGNOSIS — E11.9 TYPE 2 DIABETES MELLITUS WITHOUT COMPLICATION, WITHOUT LONG-TERM CURRENT USE OF INSULIN (HCC): Primary | ICD-10-CM

## 2022-07-25 RX ORDER — LEVOTHYROXINE SODIUM 0.07 MG/1
75 TABLET ORAL DAILY
Qty: 90 TABLET | Refills: 1 | Status: SHIPPED | OUTPATIENT
Start: 2022-07-25

## 2022-07-25 RX ORDER — SIMVASTATIN 20 MG
20 TABLET ORAL
Qty: 90 TABLET | Refills: 1 | Status: SHIPPED | OUTPATIENT
Start: 2022-07-25

## 2022-07-25 NOTE — TELEPHONE ENCOUNTER
Call [facility] who said Metformin ER 500mg is not covered for the patient  Plain Metformin 500mg is covered, can another script be sent?

## 2022-11-17 ENCOUNTER — TELEPHONE (OUTPATIENT)
Dept: FAMILY MEDICINE CLINIC | Facility: CLINIC | Age: 52
End: 2022-11-17

## 2022-11-17 DIAGNOSIS — Z11.1 SCREENING-PULMONARY TB: Primary | ICD-10-CM

## 2022-11-17 NOTE — TELEPHONE ENCOUNTER
Call from [place] [address] sister who made an appt for a MA55 and PPD so she can be placed with her mother, Destiny Haskins  Appointment scheduled with Cas Molina on 11/23/22 and is asking for a blood test for her PPD because she has no way to get her sister back and forth  There are routine labs in [facility] and is asking for the PPD lab    Also asking us to arrange for someone to come to the house to draw her blood as we did last time

## 2022-11-18 ENCOUNTER — RA CDI HCC (OUTPATIENT)
Dept: OTHER | Facility: HOSPITAL | Age: 52
End: 2022-11-18

## 2022-11-18 NOTE — PROGRESS NOTES
[facility]  coding opportunities       Chart reviewed, no opportunity found:   [address]        Patients Insurance     Medicare Insurance: [employer]age

## 2022-11-22 ENCOUNTER — OFFICE VISIT (OUTPATIENT)
Dept: FAMILY MEDICINE CLINIC | Facility: CLINIC | Age: 52
End: 2022-11-22

## 2022-11-22 VITALS
BODY MASS INDEX: 29.45 KG/M2 | HEIGHT: 60 IN | HEART RATE: 90 BPM | TEMPERATURE: 98.2 F | SYSTOLIC BLOOD PRESSURE: 128 MMHG | RESPIRATION RATE: 16 BRPM | DIASTOLIC BLOOD PRESSURE: 82 MMHG | OXYGEN SATURATION: 98 % | WEIGHT: 150 LBS

## 2022-11-22 DIAGNOSIS — E03.8 OTHER SPECIFIED HYPOTHYROIDISM: ICD-10-CM

## 2022-11-22 DIAGNOSIS — E11.9 TYPE 2 DIABETES MELLITUS WITHOUT COMPLICATION, WITHOUT LONG-TERM CURRENT USE OF INSULIN (HCC): Primary | ICD-10-CM

## 2022-11-22 DIAGNOSIS — F41.9 ANXIETY: ICD-10-CM

## 2022-11-22 DIAGNOSIS — E78.49 OTHER HYPERLIPIDEMIA: ICD-10-CM

## 2022-11-22 DIAGNOSIS — I10 ESSENTIAL HYPERTENSION: ICD-10-CM

## 2022-11-22 DIAGNOSIS — Z00.00 MEDICARE ANNUAL WELLNESS VISIT, SUBSEQUENT: ICD-10-CM

## 2022-11-22 DIAGNOSIS — Z23 IMMUNIZATION DUE: ICD-10-CM

## 2022-11-22 RX ORDER — SIMVASTATIN 20 MG
20 TABLET ORAL
Qty: 90 TABLET | Refills: 1 | Status: SHIPPED | OUTPATIENT
Start: 2022-11-22

## 2022-11-22 RX ORDER — LEVOTHYROXINE SODIUM 0.07 MG/1
75 TABLET ORAL DAILY
Qty: 90 TABLET | Refills: 1 | Status: SHIPPED | OUTPATIENT
Start: 2022-11-22

## 2022-11-22 RX ORDER — PAROXETINE HYDROCHLORIDE 20 MG/1
20 TABLET, FILM COATED ORAL DAILY
Qty: 90 TABLET | Refills: 1 | Status: SHIPPED | OUTPATIENT
Start: 2022-11-22

## 2022-11-22 RX ORDER — LISINOPRIL 10 MG/1
10 TABLET ORAL DAILY
Qty: 100 TABLET | Refills: 1 | Status: SHIPPED | OUTPATIENT
Start: 2022-11-22

## 2022-11-22 NOTE — ASSESSMENT & PLAN NOTE
- TSH elevated  - Patient's sister reports that Neena Gonzales has not been taking her medication regularly  - She is being set up with [facility] where she will have assistance with her medications  - Will hold off on increasing due to medication noncompliance  Advised to take medication consistently and we will recheck TSH in 8 weeks

## 2022-11-22 NOTE — LETTER
November 29, 2022     Patient: Alton Palumbo  YOB: 1970  Date of Visit: 11/22/2022      To Whom it May Concern:    Thaddeus Cherry is under my professional care  Juvencio Du was seen in my office on 11/22/2022  This letter is to verify that patient is free of any communicable diseases  If you have any questions or concerns, please don't hesitate to call           Sincerely,          ЕКАТЕРИНА Castillo        CC: No Recipients

## 2022-11-22 NOTE — ASSESSMENT & PLAN NOTE
- Well controlled  - Continue Zocor daily   - Encourage healthy diet and regular exercise  - Will continue to monitor fasting lipid panel

## 2022-11-22 NOTE — ASSESSMENT & PLAN NOTE
- Well controlled on current medication regimen  Continue same    - Encourage healthy diet and regular exercise  - Will continue to monitor     Lab Results   Component Value Date    HGBA1C 6 6 (H) 11/18/2022

## 2022-11-22 NOTE — PROGRESS NOTES
Assessment and Plan:     Problem List Items Addressed This Visit        Endocrine    Diabetes mellitus ([facility] [address] ) - Primary     - Well controlled on current medication regimen  Continue same    - Encourage healthy diet and regular exercise  - Will continue to monitor  Lab Results   Component Value Date    HGBA1C 6 6 (H) 11/18/2022            Relevant Medications    dapagliflozin (Farxiga) 10 MG tablet    metFORMIN (GLUCOPHAGE) 1000 MG tablet    Other Relevant Orders    Hemoglobin A1C    Microalbumin / creatinine urine ratio    Hypothyroidism     - TSH elevated  - Patient's sister reports that Reanna July has not been taking her medication regularly  - She is being set up with [facility] where she will have assistance with her medications  - Will hold off on increasing due to medication noncompliance  Advised to take medication consistently and we will recheck TSH in 8 weeks  Relevant Medications    levothyroxine 75 mcg tablet    Other Relevant Orders    TSH, 3rd generation with Free T4 reflex       Cardiovascular and Mediastinum    Essential hypertension     - Well controlled on lisinopril  Continue same    - Will continue to monitor  Relevant Medications    lisinopril (ZESTRIL) 10 mg tablet    Other Relevant Orders    CBC and differential    Comprehensive metabolic panel    Lipid Panel with Direct LDL reflex    TSH, 3rd generation with Free T4 reflex       Other    Hyperlipidemia     - Well controlled  - Continue Zocor daily   - Encourage healthy diet and regular exercise  - Will continue to monitor fasting lipid panel  Relevant Medications    simvastatin (ZOCOR) 20 mg tablet    Other Relevant Orders    Lipid Panel with Direct LDL reflex    Anxiety     - Well controlled on Paxil daily  Continue same    - Will continue to monitor  Relevant Medications    PARoxetine (PAXIL) 20 mg tablet    Medicare annual wellness visit, subsequent     - Reviewed immunizations and screenings     - Flu shot given in office today  - UTD with dental and vision exams    - Routine labs ordered  Immunization due    Relevant Orders    influenza vaccine, quadrivalent, recombinant, PF, 0 5 mL, for patients 18 yr+ (FLUBLOK) (Completed)        Preventive health issues were discussed with patient, and age appropriate screening tests were ordered as noted in patient's After Visit Summary  Personalized health advice and appropriate referrals for health education or preventive services given if needed, as noted in patient's After Visit Summary  History of Present Illness:     Patient presents for a Medicare Wellness Visit    Patient with PMH of HTN, hyperlipidemia, hypothyroidism, type 2 diabetes, depression, and mental disability [facility]  She is accompanied today by her sister  Sister states that [employer] will be going to a [employer] program soon  She is getting worse with taking her medications regularly  States she looks at her pill boxes and there are at least 2-3 days per week where medication has not been taken  She did get her labs done which showed elevated TSH  Rest of labs were stable  She denies any concerns or complaints today  Patient Care Team:  Donald Garcia MD as PCP - General (Family Medicine)     Review of Systems:     Review of Systems   Constitutional: Negative for fatigue and fever  HENT: Negative for trouble swallowing  Eyes: Negative for visual disturbance  Respiratory: Negative for cough and shortness of breath  Cardiovascular: Negative for chest pain and palpitations  Gastrointestinal: Negative for abdominal pain and blood in stool  Endocrine: Negative for cold intolerance and heat intolerance  Genitourinary: Negative for difficulty urinating and dysuria  Musculoskeletal: Negative for gait problem  Skin: Negative for rash  Neurological: Negative for dizziness, syncope and headaches  Hematological: Negative for adenopathy  Psychiatric/Behavioral: Negative for behavioral problems  Problem List:     Patient Active Problem List   Diagnosis   • Depression   • Diabetes mellitus ([place] [address] )   • Essential hypertension   • Hyperlipidemia   • Hypothyroidism   • Mental disability   • Obesity   • Healthcare maintenance   • Anxiety   • Medicare annual wellness visit, subsequent   • Immunization due      Past Medical and Surgical History:     Past Medical History:   Diagnosis Date   • Diabetes mellitus ([place] [address] )    • Disease of thyroid gland    • Hyperlipidemia    • Hypertension    • Seizures ([place] [address] )     h/o, no longer on meds   • Tooth decay      History reviewed  No pertinent surgical history     Family History:     Family History   Problem Relation Age of Onset   • Cancer Mother         unknown   • Heart attack Maternal Grandfather       Social History:     Social History     Socioeconomic History   • Marital status: Single     Spouse name: None   • Number of children: None   • Years of education: None   • Highest education level: None   Occupational History   • None   Tobacco Use   • Smoking status: Never   • Smokeless tobacco: Never   • Tobacco comments:     No passive smoke exposure   Vaping Use   • Vaping Use: Never used   Substance and Sexual Activity   • Alcohol use: None   • Drug use: No   • Sexual activity: None   Other Topics Concern   • None   Social History Narrative   • None     Social Determinants of Health     Financial Resource Strain: Not on file   Food Insecurity: Not on file   Transportation Needs: Not on file   Physical Activity: Not on file   Stress: Not on file   Social Connections: Not on file   Intimate Partner Violence: Not on file   Housing Stability: Not on file      Medications and Allergies:     Current Outpatient Medications   Medication Sig Dispense Refill   • clotrimazole-betamethasone (LOTRISONE) 1-0 05 % cream Apply topically 2 (two) times a day 45 g 2   • dapagliflozin (Farxiga) 10 MG tablet Take 1 tablet (10 mg total) by mouth every morning 90 tablet 1   • levothyroxine 75 mcg tablet Take 1 tablet (75 mcg total) by mouth daily 90 tablet 1   • lisinopril (ZESTRIL) 10 mg tablet Take 1 tablet (10 mg total) by mouth daily 100 tablet 1   • metFORMIN (GLUCOPHAGE) 1000 MG tablet Take 1 tablet (1,000 mg total) by mouth 2 (two) times a day with meals 200 tablet 1   • PARoxetine (PAXIL) 20 mg tablet Take 1 tablet (20 mg total) by mouth daily 90 tablet 1   • simvastatin (ZOCOR) 20 mg tablet Take 1 tablet (20 mg total) by mouth daily at bedtime 90 tablet 1   • econazole nitrate 1 % cream Every 12 hours (Patient not taking: Reported on 11/22/2022)     • ergocalciferol (ERGOCALCIFEROL) 1 25 MG (56583 UT) capsule Take 1 capsule (50,000 Units total) by mouth once a week (Patient not taking: Reported on 7/6/2021) 12 capsule 1   • metFORMIN (GLUCOPHAGE) 500 mg tablet Take 2 tablets (1000mg total) by mouth  2 times daily with food    (Patient not taking: Reported on 11/22/2022) 360 tablet 1     No current facility-administered medications for this visit       Allergies   Allergen Reactions   • No Known Allergies       Immunizations:     Immunization History   Administered Date(s) Administered   • COVID-19 PFIZER VACCINE 0 3 ML IM 11/11/2021, 12/09/2021   • INFLUENZA 08/08/2014, 08/27/2015, 11/01/2016, 09/12/2020   • Influenza Split 02/01/2013   • Influenza, injectable, quadrivalent, preservative free 0 5 mL 09/12/2020   • Influenza, recombinant, quadrivalent,injectable, preservative free 11/22/2022   • Influenza, seasonal, injectable 08/27/2015   • Influenza, seasonal, injectable, preservative free 09/16/2013   • Pneumococcal Polysaccharide PPV23 02/01/2013   • Tdap 11/01/2016      Health Maintenance:         Topic Date Due   • Hepatitis C Screening  Never done   • HIV Screening  Never done   • Cervical Cancer Screening  Never done   • Colorectal Cancer Screening  Never done   • Breast Cancer Screening: Mammogram  03/07/2015         Topic Date Due   • Hepatitis B Vaccine (1 of 3 - 3-dose series) Never done   • Pneumococcal Vaccine: Pediatrics (0 to 5 Years) and At-Risk Patients (6 to 59 Years) (2 - PCV) 02/01/2014   • COVID-19 Vaccine (3 - Booster for Pfizer series) 05/09/2022      Medicare Screening Tests and Risk Assessments:     Senthil Armas is here for her Subsequent Wellness visit  Last Medicare Wellness visit information reviewed, patient interviewed and updates made to the record today  Health Risk Assessment:   Patient rates overall health as good  Patient feels that their physical health rating is same  Patient is satisfied with their life  Eyesight was rated as same  Hearing was rated as slightly worse  Patient feels that their emotional and mental health rating is same  Patients states they are never, rarely angry  Patient states they are sometimes unusually tired/fatigued  Patient states that she has experienced no weight loss or gain in last 6 months  Depression Screening:   PHQ-9 Score: 0      Fall Risk Screening: In the past year, patient has experienced: no history of falling in past year      Urinary Incontinence Screening:   Patient has not leaked urine accidently in the last six months  Home Safety:  Patient does not have trouble with stairs inside or outside of their home  Patient has working smoke alarms and has no working carbon monoxide detector  Home safety hazards include: none  Nutrition:   Current diet is Diabetic  Medications:   Patient is not currently taking any over-the-counter supplements  Patient is not able to manage medications  Activities of Daily Living (ADLs)/Instrumental Activities of Daily Living (IADLs):   Walk and transfer into and out of bed and chair?: Yes  Feed yourself?  Yes  Do your laundry/housekeeping?: No  Manage your money, pay your bills and track your expenses?: No  Make your own meals?: No    Do your own shopping?: No    ADL comments: Needs assistance with hygene    Previous Hospitalizations:   Any hospitalizations or ED visits within the last 12 months?: No      Advance Care Planning:   Living will: Yes    Durable POA for healthcare: Yes    Advanced directive: Yes      Cognitive Screening:   Provider or family/friend/caregiver concerned regarding cognition?: Yes    Cognition Comments: Patient's sister is worried about medication non-compliance  Setting her up with [employer] program for additional assistance  PREVENTIVE SCREENINGS      Cardiovascular Screening:    General: Screening Not Indicated and History Lipid Disorder      Diabetes Screening:     General: Screening Not Indicated and History Diabetes      Colorectal Cancer Screening:     General: Patient Declines      Breast Cancer Screening:     General: Patient Declines      Cervical Cancer Screening:    General: Patient Declines      Osteoporosis Screening:    General: Patient Declines      Abdominal Aortic Aneurysm (AAA) Screening:        General: Screening Not Indicated      Lung Cancer Screening:     General: Screening Not Indicated      Hepatitis C Screening:    General: Patient Declines    Screening, Brief Intervention, and Referral to Treatment (SBIRT)    Screening  Typical number of drinks in a day: 0  Typical number of drinks in a week: 0  Interpretation: Low risk drinking behavior  Single Item Drug Screening:  How often have you used an illegal drug (including marijuana) or a prescription medication for non-medical reasons in the past year? never    Single Item Drug Screen Score: 0  Interpretation: Negative screen for possible drug use disorder    Other Counseling Topics:   Calcium and vitamin D intake and regular weightbearing exercise  No results found       Physical Exam:     /82 (BP Location: Left arm, Patient Position: Sitting, Cuff Size: Adult)   Pulse 90   Temp 98 2 °F (36 8 °C) (Tympanic)   Resp 16   Ht 5' (1 524 m)   Wt 68 kg (150 lb)   SpO2 98%   BMI 29 29 kg/m²     Physical Exam  Vitals and nursing note reviewed  Constitutional:       General: She is not in acute distress  Appearance: Normal appearance  She is well-developed  HENT:      Head: Normocephalic and atraumatic  Right Ear: Tympanic membrane, ear canal and external ear normal       Left Ear: Tympanic membrane, ear canal and external ear normal       Nose: No congestion  Eyes:      Conjunctiva/sclera: Conjunctivae normal       Pupils: Pupils are equal, round, and reactive to light  Cardiovascular:      Rate and Rhythm: Normal rate and regular rhythm  Heart sounds: Normal heart sounds  Pulmonary:      Effort: Pulmonary effort is normal       Breath sounds: Normal breath sounds  Abdominal:      General: Bowel sounds are normal       Palpations: Abdomen is soft  Musculoskeletal:         General: Normal range of motion  Cervical back: Normal range of motion  Lymphadenopathy:      Cervical: No cervical adenopathy  Skin:     General: Skin is warm and dry  Neurological:      Mental Status: She is alert  Mental status is at baseline     Psychiatric:         Mood and Affect: Mood normal          Behavior: Behavior normal           ЕКАТЕРИНА Lr

## 2022-11-22 NOTE — PATIENT INSTRUCTIONS
Medicare Preventive Visit Patient Instructions  Thank you for completing your Welcome to Medicare Visit or Medicare Annual Wellness Visit today  Your next wellness visit will be due in one year (11/23/2023)  The screening/preventive services that you may require over the next 5-10 years are detailed below  Some tests may not apply to you based off risk factors and/or age  Screening tests ordered at today's visit but not completed yet may show as past due  Also, please note that scanned in results may not display below  Preventive Screenings:  Service Recommendations Previous Testing/Comments   Colorectal Cancer Screening  * Colonoscopy    * Fecal Occult Blood Test (FOBT)/Fecal Immunochemical Test (FIT)  * Fecal DNA/Cologuard Test  * Flexible Sigmoidoscopy Age: 39-70 years old   Colonoscopy: every 10 years (may be performed more frequently if at higher risk)  OR  FOBT/FIT: every 1 year  OR  Cologuard: every 3 years  OR  Sigmoidoscopy: every 5 years  Screening may be recommended earlier than age 39 if at higher risk for colorectal cancer  Also, an individualized decision between you and your healthcare provider will decide whether screening between the ages of 74-80 would be appropriate  Colonoscopy: Not on file  FOBT/FIT: Not on file  Cologuard: Not on file  Sigmoidoscopy: Not on file          Breast Cancer Screening Age: 36 years old  Frequency: every 1-2 years  Not required if history of left and right mastectomy Mammogram: 03/07/2014        Cervical Cancer Screening Between the ages of 21-29, pap smear recommended once every 3 years  Between the ages of 33-67, can perform pap smear with HPV co-testing every 5 years     Recommendations may differ for women with a history of total hysterectomy, cervical cancer, or abnormal pap smears in past  Pap Smear: Not on file        Hepatitis C Screening Once for adults born between [facility]  More frequently in patients at high risk for Hepatitis C Hep C Antibody: Not on file        Diabetes Screening 1-2 times per year if you're at risk for diabetes or have pre-diabetes Fasting glucose: 75 mg/dL (7/2/2021)  A1C: 6 6 % (11/18/2022)  Screening Not Indicated  History Diabetes   Cholesterol Screening Once every 5 years if you don't have a lipid disorder  May order more often based on risk factors  Lipid panel: 07/02/2021    Screening Not Indicated  History Lipid Disorder     Other Preventive Screenings Covered by Medicare:  1  Abdominal Aortic Aneurysm (AAA) Screening: covered once if your at risk  You're considered to be at risk if you have a family history of AAA  2  Lung Cancer Screening: covers low dose CT scan once per year if you meet all of the following conditions: (1) Age 50-69; (2) No signs or symptoms of lung cancer; (3) Current smoker or have quit smoking within the last 15 years; (4) You have a tobacco smoking history of at least 20 pack years (packs per day multiplied by number of years you smoked); (5) You get a written order from a healthcare provider  3  Glaucoma Screening: covered annually if you're considered high risk: (1) You have diabetes OR (2) Family history of glaucoma OR (3)  aged 48 and older OR (3)  American aged 72 and older  3  Osteoporosis Screening: covered every 2 years if you meet one of the following conditions: (1) You're estrogen deficient and at risk for osteoporosis based off medical history and other findings; (2) Have a vertebral abnormality; (3) On glucocorticoid therapy for more than 3 months; (4) Have primary hyperparathyroidism; (5) On osteoporosis medications and need to assess response to drug therapy  · Last bone density test (DXA Scan): Not on file  5  HIV Screening: covered annually if you're between the age of 12-76  Also covered annually if you are younger than 13 and older than 72 with risk factors for HIV infection   For pregnant patients, it is covered up to 3 times per pregnancy  Immunizations:  Immunization Recommendations   Influenza Vaccine Annual influenza vaccination during flu season is recommended for all persons aged >= 6 months who do not have contraindications   Pneumococcal Vaccine   * Pneumococcal conjugate vaccine = PCV13 (Prevnar 13), PCV15 (Vaxneuvance), PCV20 (Prevnar 20)  * Pneumococcal polysaccharide vaccine = PPSV23 (Pneumovax) Adults 25-60 years old: 1-3 doses may be recommended based on certain risk factors  Adults 72 years old: 1-2 doses may be recommended based off what pneumonia vaccine you previously received   Hepatitis B Vaccine 3 dose series if at intermediate or high risk (ex: diabetes, end stage renal disease, liver disease)   Tetanus (Td) Vaccine - COST NOT COVERED BY MEDICARE PART B Following completion of primary series, a booster dose should be given every 10 years to maintain immunity against tetanus  Td may also be given as tetanus wound prophylaxis  Tdap Vaccine - COST NOT COVERED BY MEDICARE PART B Recommended at least once for all adults  For pregnant patients, recommended with each pregnancy  Shingles Vaccine (Shingrix) - COST NOT COVERED BY MEDICARE PART B  2 shot series recommended in those aged 48 and above     Health Maintenance Due:      Topic Date Due   • Hepatitis C Screening  Never done   • HIV Screening  Never done   • Cervical Cancer Screening  Never done   • Colorectal Cancer Screening  Never done   • Breast Cancer Screening: Mammogram  03/07/2015     Immunizations Due:      Topic Date Due   • Hepatitis B Vaccine (1 of 3 - 3-dose series) Never done   • Pneumococcal Vaccine: Pediatrics (0 to 5 Years) and At-Risk Patients (6 to 64 Years) (2 - PCV) 02/01/2014   • COVID-19 Vaccine (3 - Booster for Pfizer series) 05/09/2022   • Influenza Vaccine (1) 09/01/2022     Advance Directives   What are advance directives? Advance directives are legal documents that state your wishes and plans for medical care   These plans are made ahead of time in case you lose your ability to make decisions for yourself  Advance directives can apply to any medical decision, such as the treatments you want, and if you want to donate organs  What are the types of advance directives? There are many types of advance directives, and each state has rules about how to use them  You may choose a combination of any of the following:  · Living will: This is a written record of the treatment you want  You can also choose which treatments you do not want, which to limit, and which to stop at a certain time  This includes surgery, medicine, IV fluid, and tube feedings  · Durable power of  for healthcare [facility]): This is a written record that states who you want to make healthcare choices for you when you are unable to make them for yourself  This person, called a proxy, is usually a family member or a friend  You may choose more than 1 proxy  · Do not resuscitate (DNR) order:  A DNR order is used in case your heart stops beating or you stop breathing  It is a request not to have certain forms of treatment, such as CPR  A DNR order may be included in other types of advance directives  · Medical directive: This covers the care that you want if you are in a coma, near death, or unable to make decisions for yourself  You can list the treatments you want for each condition  Treatment may include pain medicine, surgery, blood transfusions, dialysis, IV or tube feedings, and a ventilator (breathing machine)  · Values history: This document has questions about your views, beliefs, and how you feel and think about life  This information can help others choose the care that you would choose  Why are advance directives important? An advance directive helps you control your care  Although spoken wishes may be used, it is better to have your wishes written down  Spoken wishes can be misunderstood, or not followed   Treatments may be given even if you do not want them  An advance directive may make it easier for your family to make difficult choices about your care  Weight Management   Why it is important to manage your weight:  Being overweight increases your risk of health conditions such as heart disease, high blood pressure, type 2 diabetes, and certain types of cancer  It can also increase your risk for osteoarthritis, sleep apnea, and other respiratory problems  Aim for a slow, steady weight loss  Even a small amount of weight loss can lower your risk of health problems  How to lose weight safely:  A safe and healthy way to lose weight is to eat fewer calories and get regular exercise  You can lose up about 1 pound a week by decreasing the number of calories you eat by 500 calories each day  Healthy meal plan for weight management:  A healthy meal plan includes a variety of foods, contains fewer calories, and helps you stay healthy  A healthy meal plan includes the following:  · Eat whole-grain foods more often  A healthy meal plan should contain fiber  Fiber is the part of grains, fruits, and vegetables that is not broken down by your body  Whole-grain foods are healthy and provide extra fiber in your diet  Some examples of whole-grain foods are whole-wheat breads and pastas, oatmeal, brown rice, and bulgur  · Eat a variety of vegetables every day  Include dark, leafy greens such as spinach, kale, ernestina greens, and mustard greens  Eat yellow and orange vegetables such as carrots, sweet potatoes, and winter squash  · Eat a variety of fruits every day  Choose fresh or canned fruit (canned in its own juice or light syrup) instead of juice  Fruit juice has very little or no fiber  · Eat low-fat dairy foods  Drink fat-free (skim) milk or 1% milk  Eat fat-free yogurt and low-fat cottage cheese  Try low-fat cheeses such as mozzarella and other reduced-fat cheeses  · Choose meat and other protein foods that are low in fat    Choose beans or other legumes such as split peas or lentils  Choose fish, skinless poultry (chicken or turkey), or lean cuts of red meat (beef or pork)  Before you cook meat or poultry, cut off any visible fat  · Use less fat and oil  Try baking foods instead of frying them  Add less fat, such as margarine, sour cream, regular salad dressing and mayonnaise to foods  Eat fewer high-fat foods  Some examples of high-fat foods include french fries, doughnuts, ice cream, and cakes  · Eat fewer sweets  Limit foods and drinks that are high in sugar  This includes candy, cookies, regular soda, and sweetened drinks  Exercise:  Exercise at least 30 minutes per day on most days of the week  Some examples of exercise include walking, biking, dancing, and swimming  You can also fit in more physical activity by taking the stairs instead of the elevator or parking farther away from stores  Ask your healthcare provider about the best exercise plan for you  © Copyright [employer] 2018 Information is for End User's use only and may not be sold, redistributed or otherwise used for commercial purposes   All illustrations and images included in CareNotes® are the copyrighted property of A D A M , [employer]  or [address] [place] [address]

## 2022-11-22 NOTE — ASSESSMENT & PLAN NOTE
- Reviewed immunizations and screenings  - Flu shot given in office today  - UTD with dental and vision exams    - Routine labs ordered

## 2022-11-28 ENCOUNTER — TELEPHONE (OUTPATIENT)
Dept: FAMILY MEDICINE CLINIC | Facility: CLINIC | Age: 52
End: 2022-11-28

## 2022-11-28 NOTE — TELEPHONE ENCOUNTER
Call from sister Guille Saenz asking about forms and labs for her sister regarding Cira Alejandre and PPD (lab scanned under media)     Asking for a call at [phone]

## 2022-11-28 NOTE — TELEPHONE ENCOUNTER
Sushma aware provider out sick  Will return tomorrow  Lilliam was unable to get fax number for [facility]  Requesting we mail the form to her (same address as Gabriela Mary) and she will forward to Jaz Moran

## 2022-11-29 NOTE — TELEPHONE ENCOUNTER
Marv Ends aware from is completed and will be mailed to her with the letter and lab result as requested

## 2023-01-03 ENCOUNTER — VBI (OUTPATIENT)
Dept: ADMINISTRATIVE | Facility: OTHER | Age: 53
End: 2023-01-03

## 2023-01-20 ENCOUNTER — RA CDI HCC (OUTPATIENT)
Dept: OTHER | Facility: HOSPITAL | Age: 53
End: 2023-01-20

## 2023-01-20 NOTE — PROGRESS NOTES
[facility]  coding opportunities       Chart reviewed, no opportunity found:   [address]        Patients Insurance     Medicare Insurance: [employer]

## 2023-01-21 PROBLEM — Z00.00 MEDICARE ANNUAL WELLNESS VISIT, SUBSEQUENT: Status: RESOLVED | Noted: 2022-11-22 | Resolved: 2023-01-21

## 2023-01-23 ENCOUNTER — OFFICE VISIT (OUTPATIENT)
Dept: FAMILY MEDICINE CLINIC | Facility: CLINIC | Age: 53
End: 2023-01-23

## 2023-01-23 VITALS
HEART RATE: 114 BPM | TEMPERATURE: 97.5 F | OXYGEN SATURATION: 98 % | SYSTOLIC BLOOD PRESSURE: 118 MMHG | HEIGHT: 60 IN | DIASTOLIC BLOOD PRESSURE: 68 MMHG | WEIGHT: 160 LBS | BODY MASS INDEX: 31.41 KG/M2

## 2023-01-23 DIAGNOSIS — E55.9 VITAMIN D INSUFFICIENCY: ICD-10-CM

## 2023-01-23 DIAGNOSIS — Z12.11 COLON CANCER SCREENING: ICD-10-CM

## 2023-01-23 DIAGNOSIS — Z12.31 BREAST CANCER SCREENING BY MAMMOGRAM: ICD-10-CM

## 2023-01-23 DIAGNOSIS — E11.69 TYPE 2 DIABETES MELLITUS WITH OTHER SPECIFIED COMPLICATION, WITHOUT LONG-TERM CURRENT USE OF INSULIN (HCC): Primary | ICD-10-CM

## 2023-01-23 DIAGNOSIS — F41.9 ANXIETY: ICD-10-CM

## 2023-01-23 DIAGNOSIS — E03.8 OTHER SPECIFIED HYPOTHYROIDISM: ICD-10-CM

## 2023-01-23 DIAGNOSIS — E78.49 OTHER HYPERLIPIDEMIA: ICD-10-CM

## 2023-01-23 DIAGNOSIS — Z20.5 EXPOSURE TO VIRAL HEPATITIS: ICD-10-CM

## 2023-01-23 DIAGNOSIS — L30.9 DERMATITIS: ICD-10-CM

## 2023-01-23 DIAGNOSIS — E11.9 TYPE 2 DIABETES MELLITUS WITHOUT COMPLICATION, WITHOUT LONG-TERM CURRENT USE OF INSULIN (HCC): ICD-10-CM

## 2023-01-23 DIAGNOSIS — I10 ESSENTIAL HYPERTENSION: ICD-10-CM

## 2023-01-23 RX ORDER — PAROXETINE HYDROCHLORIDE 20 MG/1
20 TABLET, FILM COATED ORAL DAILY
Qty: 90 TABLET | Refills: 1 | Status: SHIPPED | OUTPATIENT
Start: 2023-01-23

## 2023-01-23 RX ORDER — SIMVASTATIN 20 MG
20 TABLET ORAL
Qty: 90 TABLET | Refills: 1 | Status: SHIPPED | OUTPATIENT
Start: 2023-01-23

## 2023-01-23 RX ORDER — LISINOPRIL 10 MG/1
10 TABLET ORAL DAILY
Qty: 100 TABLET | Refills: 1 | Status: SHIPPED | OUTPATIENT
Start: 2023-01-23

## 2023-01-23 RX ORDER — LEVOTHYROXINE SODIUM 0.07 MG/1
75 TABLET ORAL DAILY
Qty: 90 TABLET | Refills: 1 | Status: SHIPPED | OUTPATIENT
Start: 2023-01-23

## 2023-01-23 RX ORDER — CLOTRIMAZOLE AND BETAMETHASONE DIPROPIONATE 10; .64 MG/G; MG/G
CREAM TOPICAL 2 TIMES DAILY
Qty: 45 G | Refills: 2 | Status: SHIPPED | OUTPATIENT
Start: 2023-01-23

## 2023-01-23 NOTE — ASSESSMENT & PLAN NOTE
Cholesterol is well controlled  Continue on simvastatin 20 mg daily  We will continue to monitor fasting lipid profile

## 2023-01-23 NOTE — ASSESSMENT & PLAN NOTE
Lab Results   Component Value Date    HGBA1C 6 6 (H) 11/18/2022   Her A1c is well controlled but recently she has not been watching her diet  We will get a continue same medications for now and I will check her A1c  I will consider adding Ozempic after I receive her blood work results

## 2023-01-23 NOTE — PROGRESS NOTES
Name: Naveen Reid      : 1970      MRN: [id number]  Encounter Provider: Nicola Will MD  Encounter Date: 2023   Encounter department: [address] [place] [address]  Type 2 diabetes mellitus with other specified complication, without long-term current use of insulin [facility])  Assessment & Plan:    Lab Results   Component Value Date    HGBA1C 6 6 (H) 2022   Her A1c is well controlled but recently she has not been watching her diet  We will get a continue same medications for now and I will check her A1c  I will consider adding Ozempic after I receive her blood work results  Orders:  -     Hemoglobin A1C; Future; Expected date: 2023  -     Comprehensive metabolic panel; Future; Expected date: 2023  -     CBC and differential; Future; Expected date: 2023  -     Microalbumin / creatinine urine ratio; Future; Expected date: 2023  -     Lipid Panel with Direct LDL reflex; Future; Expected date: 2023  -     TSH, 3rd generation with Free T4 reflex; Future; Expected date: 2023    2  Breast cancer screening by mammogram  -     Mammo screening bilateral w 3d & cad; Future; Expected date: 2023    3  Colon cancer screening  -     Cologuard    4  Dermatitis  Assessment & Plan:  She was given prescription for Lotrisone cream     Orders:  -     clotrimazole-betamethasone (LOTRISONE) 1-0 05 % cream; Apply topically 2 (two) times a day    5  Other specified hypothyroidism  Assessment & Plan:  Stable  Continue on levothyroxine  I will check her TSH with nephrology 4  Orders:  -     levothyroxine 75 mcg tablet; Take 1 tablet (75 mcg total) by mouth daily    6  Essential hypertension  Assessment & Plan:  Blood pressure is well controlled  Continue on lisinopril 10 mg daily  We will continue to monitor  Orders:  -     lisinopril (ZESTRIL) 10 mg tablet; Take 1 tablet (10 mg total) by mouth daily    7   Type 2 diabetes mellitus without complication, without long-term current use of insulin (HCC)  Assessment & Plan:    Lab Results   Component Value Date    HGBA1C 6 6 (H) 11/18/2022   Her A1c is well controlled but recently she has not been watching her diet  We will get a continue same medications for now and I will check her A1c  I will consider adding Ozempic after I receive her blood work results  Orders:  -     metFORMIN (GLUCOPHAGE) 1000 MG tablet; Take 1 tablet (1,000 mg total) by mouth 2 (two) times a day with meals    8  Anxiety  Assessment & Plan:  Stable on Paxil  Continue same  We will continue to monitor  Orders:  -     PARoxetine (PAXIL) 20 mg tablet; Take 1 tablet (20 mg total) by mouth daily    9  Other hyperlipidemia  Assessment & Plan:  Cholesterol is well controlled  Continue on simvastatin 20 mg daily  We will continue to monitor fasting lipid profile  Orders:  -     simvastatin (ZOCOR) 20 mg tablet; Take 1 tablet (20 mg total) by mouth daily at bedtime    10  Exposure to viral hepatitis  -     Hepatitis panel, acute; Future    11  BMI 28 0-28 9,adult    12  Vitamin D insufficiency  -     Vitamin D 25 hydroxy; Future    13  BMI 31 0-31 9,adult           Subjective     She is here today for follow-up multiple medical problems  Has been taking medication  Denies any side effects  She has been living with caregiver who is taking care of her medications  She has not been watching her diet very well she has been eating more than usual   She gained a lot of weight lately  Review of Systems   Constitutional: Negative for chills and fever  HENT: Negative for trouble swallowing  Eyes: Negative for visual disturbance  Respiratory: Negative for cough and shortness of breath  Cardiovascular: Negative for chest pain, palpitations and leg swelling  Gastrointestinal: Negative for abdominal pain, constipation and diarrhea  Endocrine: Negative for cold intolerance and heat intolerance  Genitourinary: Negative for difficulty urinating and dysuria  Musculoskeletal: Negative for gait problem  Skin: Positive for rash  Neurological: Negative for dizziness, tremors, seizures and headaches  Hematological: Negative for adenopathy  Psychiatric/Behavioral: Negative for behavioral problems  Past Medical History:   Diagnosis Date   • Diabetes mellitus ([facility] )    • Disease of thyroid gland    • Hyperlipidemia    • Hypertension    • Seizures ([facility] )     h/o, no longer on meds   • Tooth decay      History reviewed  No pertinent surgical history    Family History   Problem Relation Age of Onset   • Cancer Mother         unknown   • Heart attack Maternal Grandfather      Social History     Socioeconomic History   • Marital status: Single     Spouse name: None   • Number of children: None   • Years of education: None   • Highest education level: None   Occupational History   • None   Tobacco Use   • Smoking status: Never   • Smokeless tobacco: Never   • Tobacco comments:     No passive smoke exposure   Vaping Use   • Vaping Use: Never used   Substance and Sexual Activity   • Alcohol use: None   • Drug use: No   • Sexual activity: None   Other Topics Concern   • None   Social History Narrative   • None     Social Determinants of Health     Financial Resource Strain: Not on file   Food Insecurity: Not on file   Transportation Needs: Not on file   Physical Activity: Not on file   Stress: Not on file   Social Connections: Not on file   Intimate Partner Violence: Not on file   Housing Stability: Not on file     Current Outpatient Medications on File Prior to Visit   Medication Sig   • [DISCONTINUED] clotrimazole-betamethasone (LOTRISONE) 1-0 05 % cream Apply topically 2 (two) times a day   • [DISCONTINUED] levothyroxine 75 mcg tablet Take 1 tablet (75 mcg total) by mouth daily   • [DISCONTINUED] lisinopril (ZESTRIL) 10 mg tablet Take 1 tablet (10 mg total) by mouth daily   • [DISCONTINUED] metFORMIN (GLUCOPHAGE) 1000 MG tablet Take 1 tablet (1,000 mg total) by mouth 2 (two) times a day with meals   • [DISCONTINUED] PARoxetine (PAXIL) 20 mg tablet Take 1 tablet (20 mg total) by mouth daily   • [DISCONTINUED] simvastatin (ZOCOR) 20 mg tablet Take 1 tablet (20 mg total) by mouth daily at bedtime   • econazole nitrate 1 % cream Every 12 hours (Patient not taking: Reported on 11/22/2022)   • ergocalciferol (ERGOCALCIFEROL) 1 25 MG (40007 UT) capsule Take 1 capsule (50,000 Units total) by mouth once a week (Patient not taking: Reported on 7/6/2021)   • metFORMIN (GLUCOPHAGE) 500 mg tablet Take 2 tablets (1000mg total) by mouth  2 times daily with food    (Patient not taking: Reported on 11/22/2022)   • [DISCONTINUED] dapagliflozin (Farxiga) 10 MG tablet Take 1 tablet (10 mg total) by mouth every morning (Patient not taking: Reported on 1/23/2023)     Allergies   Allergen Reactions   • No Known Allergies      Immunization History   Administered Date(s) Administered   • COVID-19 PFIZER VACCINE 0 3 ML IM 11/11/2021, 12/09/2021   • INFLUENZA 08/08/2014, 08/27/2015, 11/01/2016, 09/12/2020   • Influenza Split 02/01/2013   • Influenza, injectable, quadrivalent, preservative free 0 5 mL 09/12/2020   • Influenza, recombinant, quadrivalent,injectable, preservative free 11/22/2022   • Influenza, seasonal, injectable 08/27/2015   • Influenza, seasonal, injectable, preservative free 09/16/2013   • Pneumococcal Polysaccharide PPV23 02/01/2013   • Tdap 11/01/2016       Objective     /68 (BP Location: Left arm, Patient Position: Sitting, Cuff Size: Adult)   Pulse (!) 114   Temp 97 5 °F (36 4 °C) (Tympanic)   Ht 5' (1 524 m)   Wt 72 6 kg (160 lb)   SpO2 98%   BMI 31 25 kg/m²     Physical Exam  Vitals and nursing note reviewed  Constitutional:       Appearance: Normal appearance  She is well-developed  HENT:      Head: Normocephalic and atraumatic  Eyes:      Pupils: Pupils are equal, round, and reactive to light  Cardiovascular:      Rate and Rhythm: Normal rate and regular rhythm  Heart sounds: Normal heart sounds  Pulmonary:      Effort: Pulmonary effort is normal       Breath sounds: Normal breath sounds  Abdominal:      General: Bowel sounds are normal       Palpations: Abdomen is soft  Musculoskeletal:         General: Normal range of motion  Cervical back: Normal range of motion and neck supple  Lymphadenopathy:      Cervical: No cervical adenopathy  Skin:     General: Skin is warm  Findings: Rash present  Neurological:      Mental Status: She is alert and oriented to person, place, and time  Cranial Nerves: No cranial nerve deficit  Lauree Lennox, MD BMI Counseling: Body mass index is 31 25 kg/m²  The BMI is above normal  Nutrition recommendations include reducing portion sizes, decreasing overall calorie intake and 3-5 servings of fruits/vegetables daily  Exercise recommendations include moderate aerobic physical activity for 150 minutes/week

## 2023-01-25 ENCOUNTER — TELEPHONE (OUTPATIENT)
Dept: FAMILY MEDICINE CLINIC | Facility: CLINIC | Age: 53
End: 2023-01-25

## 2023-01-25 NOTE — TELEPHONE ENCOUNTER
Call from [facility], caretaker, who was here Monday with patient and said Dr David Koroma told her to get her labs in one week  Some of the lab orders are dated for April and lab would not draw    Asking about this and either call her or fax new orders to [phone] phone: [phone]

## 2023-01-25 NOTE — TELEPHONE ENCOUNTER
Confirmed with Dr Khushbu Duarte, pt should have labs done now and follow up in April Dawn at [facility] aware and updated labs faxed

## 2023-01-27 ENCOUNTER — APPOINTMENT (OUTPATIENT)
Dept: LAB | Facility: IMAGING CENTER | Age: 53
End: 2023-01-27

## 2023-01-27 DIAGNOSIS — I10 ESSENTIAL HYPERTENSION: ICD-10-CM

## 2023-01-27 DIAGNOSIS — Z11.1 SCREENING-PULMONARY TB: ICD-10-CM

## 2023-01-27 DIAGNOSIS — Z20.5 EXPOSURE TO VIRAL HEPATITIS: ICD-10-CM

## 2023-01-27 DIAGNOSIS — E78.49 OTHER HYPERLIPIDEMIA: ICD-10-CM

## 2023-01-27 DIAGNOSIS — E11.9 TYPE 2 DIABETES MELLITUS WITHOUT COMPLICATION, WITHOUT LONG-TERM CURRENT USE OF INSULIN (HCC): ICD-10-CM

## 2023-01-27 DIAGNOSIS — E55.9 VITAMIN D INSUFFICIENCY: ICD-10-CM

## 2023-01-27 DIAGNOSIS — E11.69 TYPE 2 DIABETES MELLITUS WITH OTHER SPECIFIED COMPLICATION, WITHOUT LONG-TERM CURRENT USE OF INSULIN (HCC): ICD-10-CM

## 2023-01-27 DIAGNOSIS — E03.8 OTHER SPECIFIED HYPOTHYROIDISM: ICD-10-CM

## 2023-01-27 LAB
25(OH)D3 SERPL-MCNC: 27.1 NG/ML (ref 30–100)
ALBUMIN SERPL BCP-MCNC: 4.1 G/DL (ref 3.5–5)
ALP SERPL-CCNC: 108 U/L (ref 46–116)
ALT SERPL W P-5'-P-CCNC: 29 U/L (ref 12–78)
ANION GAP SERPL CALCULATED.3IONS-SCNC: 7 MMOL/L (ref 4–13)
AST SERPL W P-5'-P-CCNC: 16 U/L (ref 5–45)
BASOPHILS # BLD AUTO: 0.04 THOUSANDS/ÂΜL (ref 0–0.1)
BASOPHILS NFR BLD AUTO: 0 % (ref 0–1)
BILIRUB SERPL-MCNC: 0.41 MG/DL (ref 0.2–1)
BUN SERPL-MCNC: 26 MG/DL (ref 5–25)
CALCIUM SERPL-MCNC: 8.1 MG/DL (ref 8.3–10.1)
CHLORIDE SERPL-SCNC: 100 MMOL/L (ref 96–108)
CHOLEST SERPL-MCNC: 121 MG/DL
CO2 SERPL-SCNC: 27 MMOL/L (ref 21–32)
CREAT SERPL-MCNC: 0.67 MG/DL (ref 0.6–1.3)
CREAT UR-MCNC: 76.3 MG/DL
EOSINOPHIL # BLD AUTO: 0.26 THOUSAND/ÂΜL (ref 0–0.61)
EOSINOPHIL NFR BLD AUTO: 2 % (ref 0–6)
ERYTHROCYTE [DISTWIDTH] IN BLOOD BY AUTOMATED COUNT: 13.6 % (ref 11.6–15.1)
EST. AVERAGE GLUCOSE BLD GHB EST-MCNC: 140 MG/DL
GFR SERPL CREATININE-BSD FRML MDRD: 100 ML/MIN/1.73SQ M
GLUCOSE P FAST SERPL-MCNC: 118 MG/DL (ref 65–99)
HAV IGM SER QL: NORMAL
HBA1C MFR BLD: 6.5 %
HBV CORE IGM SER QL: NORMAL
HBV SURFACE AG SER QL: NORMAL
HCT VFR BLD AUTO: 38.8 % (ref 34.8–46.1)
HCV AB SER QL: NORMAL
HDLC SERPL-MCNC: 52 MG/DL
HGB BLD-MCNC: 12.1 G/DL (ref 11.5–15.4)
IMM GRANULOCYTES # BLD AUTO: 0.04 THOUSAND/UL (ref 0–0.2)
IMM GRANULOCYTES NFR BLD AUTO: 0 % (ref 0–2)
LDLC SERPL CALC-MCNC: 54 MG/DL (ref 0–100)
LYMPHOCYTES # BLD AUTO: 3.2 THOUSANDS/ÂΜL (ref 0.6–4.47)
LYMPHOCYTES NFR BLD AUTO: 29 % (ref 14–44)
MCH RBC QN AUTO: 28.5 PG (ref 26.8–34.3)
MCHC RBC AUTO-ENTMCNC: 31.2 G/DL (ref 31.4–37.4)
MCV RBC AUTO: 92 FL (ref 82–98)
MICROALBUMIN UR-MCNC: <5 MG/L (ref 0–20)
MICROALBUMIN/CREAT 24H UR: <7 MG/G CREATININE (ref 0–30)
MONOCYTES # BLD AUTO: 0.7 THOUSAND/ÂΜL (ref 0.17–1.22)
MONOCYTES NFR BLD AUTO: 6 % (ref 4–12)
NEUTROPHILS # BLD AUTO: 6.82 THOUSANDS/ÂΜL (ref 1.85–7.62)
NEUTS SEG NFR BLD AUTO: 63 % (ref 43–75)
NRBC BLD AUTO-RTO: 0 /100 WBCS
PLATELET # BLD AUTO: 245 THOUSANDS/UL (ref 149–390)
PMV BLD AUTO: 11.7 FL (ref 8.9–12.7)
POTASSIUM SERPL-SCNC: 4.4 MMOL/L (ref 3.5–5.3)
PROT SERPL-MCNC: 8.5 G/DL (ref 6.4–8.4)
RBC # BLD AUTO: 4.24 MILLION/UL (ref 3.81–5.12)
SODIUM SERPL-SCNC: 134 MMOL/L (ref 135–147)
T4 FREE SERPL-MCNC: 1.03 NG/DL (ref 0.76–1.46)
TRIGL SERPL-MCNC: 77 MG/DL
TSH SERPL DL<=0.05 MIU/L-ACNC: 4.82 UIU/ML (ref 0.45–4.5)
WBC # BLD AUTO: 11.06 THOUSAND/UL (ref 4.31–10.16)

## 2023-01-30 LAB
GAMMA INTERFERON BACKGROUND BLD IA-ACNC: 0.04 IU/ML
M TB IFN-G BLD-IMP: NEGATIVE
M TB IFN-G CD4+ BCKGRND COR BLD-ACNC: 0.01 IU/ML
M TB IFN-G CD4+ BCKGRND COR BLD-ACNC: 0.02 IU/ML
MITOGEN IGNF BCKGRD COR BLD-ACNC: >10 IU/ML

## 2023-02-10 ENCOUNTER — TELEPHONE (OUTPATIENT)
Dept: FAMILY MEDICINE CLINIC | Facility: CLINIC | Age: 53
End: 2023-02-10

## 2023-02-10 DIAGNOSIS — L30.9 DERMATITIS: ICD-10-CM

## 2023-02-10 NOTE — TELEPHONE ENCOUNTER
Call from [facility] who said lotrisone was ordered for bid and would like it changed to prn since the rash on her neck has healed

## 2023-02-14 ENCOUNTER — TELEPHONE (OUTPATIENT)
Dept: FAMILY MEDICINE CLINIC | Facility: CLINIC | Age: 53
End: 2023-02-14

## 2023-02-14 DIAGNOSIS — E03.8 OTHER SPECIFIED HYPOTHYROIDISM: ICD-10-CM

## 2023-02-14 RX ORDER — LEVOTHYROXINE SODIUM 88 UG/1
88 TABLET ORAL DAILY
Qty: 90 TABLET | Refills: 1 | Status: SHIPPED | OUTPATIENT
Start: 2023-02-14 | End: 2023-07-19 | Stop reason: SDUPTHER

## 2023-02-14 RX ORDER — CLOTRIMAZOLE AND BETAMETHASONE DIPROPIONATE 10; .64 MG/G; MG/G
CREAM TOPICAL 2 TIMES DAILY PRN
Qty: 45 G | Refills: 2 | Status: SHIPPED | OUTPATIENT
Start: 2023-02-14

## 2023-02-14 NOTE — TELEPHONE ENCOUNTER
Blood work came back showing elevated TSH  I am going to increase her levothyroxine to 88 mcg daily  I sent the prescription  Her vitamin D came back slightly low I recommend vitamin D 2000 units daily  Her A1c is well controlled at 6 5  All the rest of blood work came back stable

## 2023-03-15 LAB
LEFT EYE DIABETIC RETINOPATHY: NORMAL
RIGHT EYE DIABETIC RETINOPATHY: NORMAL

## 2023-03-16 ENCOUNTER — OFFICE VISIT (OUTPATIENT)
Dept: OBGYN CLINIC | Facility: CLINIC | Age: 53
End: 2023-03-16

## 2023-03-16 ENCOUNTER — HOSPITAL ENCOUNTER (OUTPATIENT)
Dept: RADIOLOGY | Facility: IMAGING CENTER | Age: 53
End: 2023-03-16

## 2023-03-16 VITALS — HEIGHT: 60 IN | BODY MASS INDEX: 31.81 KG/M2 | WEIGHT: 162.04 LBS

## 2023-03-16 VITALS
WEIGHT: 162 LBS | DIASTOLIC BLOOD PRESSURE: 68 MMHG | BODY MASS INDEX: 31.8 KG/M2 | SYSTOLIC BLOOD PRESSURE: 118 MMHG | HEIGHT: 60 IN

## 2023-03-16 DIAGNOSIS — Z12.31 ENCOUNTER FOR SCREENING MAMMOGRAM FOR BREAST CANCER: ICD-10-CM

## 2023-03-16 DIAGNOSIS — Z11.51 SCREENING FOR HPV (HUMAN PAPILLOMAVIRUS): ICD-10-CM

## 2023-03-16 DIAGNOSIS — Z01.419 ENCOUNTER FOR GYNECOLOGICAL EXAMINATION WITHOUT ABNORMAL FINDING: Primary | ICD-10-CM

## 2023-03-16 DIAGNOSIS — Z12.31 BREAST CANCER SCREENING BY MAMMOGRAM: ICD-10-CM

## 2023-03-16 DIAGNOSIS — Z12.4 ENCOUNTER FOR PAPANICOLAOU SMEAR FOR CERVICAL CANCER SCREENING: ICD-10-CM

## 2023-03-16 NOTE — PROGRESS NOTES
Assessment / Plan    1  Encounter for gynecological examination without abnormal finding  First well woman exam, limited  First pap w/ HPV obtained  Per caregiver, patient has cologuard order  She will discuss colonoscopy with PCP since patient's father had colon cancer  2  Encounter for Papanicolaou smear for cervical cancer screening    - Liquid-based pap, screening    3  Screening for HPV (human papillomavirus)    - Liquid-based pap, screening    4  Encounter for screening mammogram for breast cancer  Has appointment today  Rosa Sarabia is a 47 y o  female who presents for her annual gynecologic exam     NEW PATIENT  46 yo G0 PMF  Presents with caregiver "Gabino Boyd whom patient lives  Patient has never had a GYN exam or mammogram however th e state requires that she have one  Patient is questionable historian and caregiver will help with what history is known  She has never been sexually active  Last pap: none on record  Pap Hx: never done  STD hx: negative   Mammo ; has one scheduled today  Colonoscopy- cologuard ordered  Father with h/o colon cancer  Sexually active: never  Calcium intake: discussed guidelines, supplement  Exercise: no   Safety: yes    Periods are absent  Current contraception: abstinence and post menopausal status  History of abnormal Pap smear: n/a  Family history of breast,uterine, ovarian or colon cancer: yes - father colon ca    Menstrual History:  OB History        0    Para   0    Term   0       0    AB   0    Living   0       SAB   0    IAB   0    Ectopic   0    Multiple   0    Live Births   0           Obstetric Comments   Menarche 20   Menopause 49              No LMP recorded   Patient is postmenopausal        The following portions of the patient's history were reviewed and updated as appropriate: allergies, current medications, past family history, past medical history, past social history, past surgical history and problem list     Review of Systems      Review of Systems   Constitutional: Negative for chills and fever  Respiratory: Negative for cough and shortness of breath  Gastrointestinal: Negative for abdominal distention, abdominal pain, blood in stool, constipation, diarrhea, nausea and vomiting  Genitourinary: Negative for difficulty urinating, dysuria, frequency, genital sores, hematuria, menstrual problem, pelvic pain, urgency, vaginal bleeding and vaginal discharge  Musculoskeletal: Negative for arthralgias and myalgias  Breasts:  Negative for skin changes, dimpling, asymmetry, nipple discharge, redness, tenderness or palpable masses      Objective      /68 (BP Location: Right arm, Patient Position: Sitting, Cuff Size: Standard)   Ht 5' (1 524 m)   Wt 73 5 kg (162 lb)   BMI 31 64 kg/m²   Physical Exam  Constitutional:       General: She is not in acute distress  Appearance: Normal appearance  She is well-developed  She is not ill-appearing or diaphoretic  Comments: bmi 31 6   HENT:      Head: Normocephalic and atraumatic  Eyes:      Pupils: Pupils are equal, round, and reactive to light  Neck:      Thyroid: No thyromegaly  Pulmonary:      Effort: Pulmonary effort is normal    Chest:   Breasts:     Breasts are symmetrical       Right: No inverted nipple, mass, nipple discharge, skin change or tenderness  Left: No inverted nipple, mass, nipple discharge, skin change or tenderness  Abdominal:      General: There is no distension  Palpations: Abdomen is soft  There is no mass  Tenderness: There is no abdominal tenderness  There is no guarding or rebound  Genitourinary:     General: Normal vulva  Exam position: Lithotomy position  Labia:         Right: No rash, tenderness, lesion or injury  Left: No rash, tenderness, lesion or injury  Vagina: No signs of injury and foreign body  No vaginal discharge, erythema, tenderness or bleeding  Cervix: No cervical motion tenderness, discharge or friability  Uterus: Not enlarged and not tender  Adnexa:         Right: No mass or tenderness  Left: No mass or tenderness  Comments: Blind pap due to speculum exam being painful for patient  Exam limited by habitus    Musculoskeletal:      Cervical back: Neck supple  Lymphadenopathy:      Cervical: No cervical adenopathy  Upper Body:      Right upper body: No supraclavicular adenopathy  Left upper body: No supraclavicular adenopathy  Skin:     General: Skin is warm and dry  Neurological:      General: No focal deficit present  Mental Status: She is alert and oriented to person, place, and time  Psychiatric:         Mood and Affect: Mood normal          Behavior: Behavior normal          Thought Content:  Thought content normal          Judgment: Judgment normal

## 2023-03-16 NOTE — PATIENT INSTRUCTIONS
For your ongoing bone health:  Recommend taking Calcium Citrate 1200 mg (including dietary sources) AND vitamin D 800 IU daily-- split doses in morning & evening AND weight bearing exercise of 30 minutes duration 4-5 times per week

## 2023-03-17 ENCOUNTER — TELEPHONE (OUTPATIENT)
Dept: FAMILY MEDICINE CLINIC | Facility: CLINIC | Age: 53
End: 2023-03-17

## 2023-03-17 DIAGNOSIS — Z12.11 COLON CANCER SCREENING: Primary | ICD-10-CM

## 2023-03-17 LAB
HPV HR 12 DNA CVX QL NAA+PROBE: NEGATIVE
HPV16 DNA CVX QL NAA+PROBE: NEGATIVE
HPV18 DNA CVX QL NAA+PROBE: NEGATIVE

## 2023-03-17 NOTE — TELEPHONE ENCOUNTER
Message from a caregiver who said that she was given Cologuard in the past and recently discovered her father had colon cancer so is asking for a referral for a colonoscopy  Wants to go to [facility]      She does have an appt in April with Dr Jessica Tanner   Asking for a referral to be faxed to [phone]

## 2023-03-17 NOTE — TELEPHONE ENCOUNTER
Referral placed to [facility]   For colon cancer screen  Cherri kerr referral faxed to [facility]

## 2023-03-24 LAB
LAB AP GYN PRIMARY INTERPRETATION: NORMAL
Lab: NORMAL

## 2023-03-31 ENCOUNTER — TELEPHONE (OUTPATIENT)
Dept: FAMILY MEDICINE CLINIC | Facility: CLINIC | Age: 53
End: 2023-03-31

## 2023-03-31 NOTE — TELEPHONE ENCOUNTER
----- Message from [address] sent at 3/31/2023  9:20 AM EDT -----  Diabetic eye exam shows no retinopathy  Mammogram shows no evidence of malignancy  Recommend routine screening mammogram in 1 year

## 2023-04-03 DIAGNOSIS — E11.9 TYPE 2 DIABETES MELLITUS WITHOUT COMPLICATION, WITHOUT LONG-TERM CURRENT USE OF INSULIN (HCC): ICD-10-CM

## 2023-04-03 DIAGNOSIS — E78.49 OTHER HYPERLIPIDEMIA: ICD-10-CM

## 2023-04-03 DIAGNOSIS — I10 ESSENTIAL HYPERTENSION: ICD-10-CM

## 2023-04-03 RX ORDER — SIMVASTATIN 20 MG
20 TABLET ORAL
Qty: 90 TABLET | Refills: 1 | Status: SHIPPED | OUTPATIENT
Start: 2023-04-03

## 2023-04-03 RX ORDER — LISINOPRIL 10 MG/1
10 TABLET ORAL DAILY
Qty: 100 TABLET | Refills: 1 | Status: SHIPPED | OUTPATIENT
Start: 2023-04-03

## 2023-04-03 NOTE — TELEPHONE ENCOUNTER
[facility] Lis[facility], Metformin, simvastatin to [facility]  Refills sent per protocol

## 2023-04-19 ENCOUNTER — TELEPHONE (OUTPATIENT)
Dept: ADMINISTRATIVE | Facility: OTHER | Age: 53
End: 2023-04-19

## 2023-04-19 ENCOUNTER — APPOINTMENT (OUTPATIENT)
Dept: LAB | Facility: IMAGING CENTER | Age: 53
End: 2023-04-19

## 2023-04-19 DIAGNOSIS — E03.8 OTHER SPECIFIED HYPOTHYROIDISM: ICD-10-CM

## 2023-04-19 PROBLEM — M40.209 KYPHOSIS: Status: ACTIVE | Noted: 2023-04-19

## 2023-04-19 PROBLEM — R46.0 POOR HYGIENE: Status: ACTIVE | Noted: 2023-04-19

## 2023-04-19 PROBLEM — E55.9 VITAMIN D INSUFFICIENCY: Status: ACTIVE | Noted: 2023-04-19

## 2023-04-19 LAB — TSH SERPL DL<=0.05 MIU/L-ACNC: 0.46 UIU/ML (ref 0.45–4.5)

## 2023-04-19 NOTE — TELEPHONE ENCOUNTER
Upon review of the In Basket request and the patient's chart, initial outreach has been made via fax to facility  Please see Contacts section for details       Thank you  Rich Dale MA

## 2023-04-19 NOTE — LETTER
Diabetic Foot Exam Form    Date Requested: 23  Patient: Syl Figueroa  Patient : 1970   Referring Provider: Anu Fitzgerald MD    Diabetic Foot Exam Performed with shoes and socks removed        Yes         No     Date of Diabetic Foot Exam ______________________________  Risk Score ____________________________________________    Left Foot       Visual Inspection         Monofilament Testing Sensory Exam        Pedal Pulses         Additional Comments         Right Foot      Visual Inspection         Monofilament Testing Sensory Exam       Pedal Pulses         Additional Comments         Comments __________________________________________________________    Practice Providing Exam ______________________________________________    Exam Performed By (print name) _______________________________________      Provider Signature ___________________________________________________      These reports are needed for  compliance  Please fax this completed form and a copy of the Diabetic Foot Exam report to our office located at [place] [address] as soon as possible via Fax [phone] robert Peres: Phone [phone]    We thank you for your assistance in treating our mutual patient

## 2023-04-19 NOTE — TELEPHONE ENCOUNTER
----- Message from Lorelei Clemons sent at 4/19/2023  8:47 AM EDT -----  Regarding: DM foot exam  04/19/23 8:48 AM    Hello, our patient Zunilda Loving has had Diabetic Foot Exam completed/performed  Please assist in updating the patient chart by making an External outreach to Dr Paulette Law facility located at [facility]  The date of service is 2/10/23      Thank you,  Lorelei Clemons  [facility] [address] [facility]

## 2023-04-25 ENCOUNTER — TELEPHONE (OUTPATIENT)
Dept: FAMILY MEDICINE CLINIC | Facility: CLINIC | Age: 53
End: 2023-04-25

## 2023-04-25 NOTE — TELEPHONE ENCOUNTER
I checked communication form and lm with normal results and no changes in medication dose   Pt is to call with any questions

## 2023-04-25 NOTE — TELEPHONE ENCOUNTER
----- Message from Shelby Lara MD sent at 4/23/2023  5:57 PM EDT -----  TSH came back normal   Continue same dose of levothyroxine  Ravi Gonzales

## 2023-04-27 NOTE — TELEPHONE ENCOUNTER
As a follow-up, a second attempt has been made for outreach via fax to facility  Please see Contacts section for details      Thank you  Shara Hatfield MA

## 2023-04-27 NOTE — TELEPHONE ENCOUNTER
Upon review of the In Basket request we were able to locate, review, and update the patient chart as requested for Diabetic Foot Exam     Any additional questions or concerns should be emailed to the Practice Liaisons via the appropriate education email address, please do not reply via In Basket      Thank you  Juan M Crow MA

## 2023-05-08 ENCOUNTER — TELEPHONE (OUTPATIENT)
Dept: FAMILY MEDICINE CLINIC | Facility: CLINIC | Age: 53
End: 2023-05-08

## 2023-05-08 NOTE — TELEPHONE ENCOUNTER
"Call from [facility] who said patient's lab orders say \"expected date 65/23\" and her appt is that same day and she wants to get her labs prior to appt so is asking for the expected date to be changed     "

## 2023-05-08 NOTE — TELEPHONE ENCOUNTER
I addended office visit 4/19/23 to change the expected date to today for blood work       I called vero back and she is aware

## 2023-05-17 ENCOUNTER — TELEPHONE (OUTPATIENT)
Dept: FAMILY MEDICINE CLINIC | Facility: CLINIC | Age: 53
End: 2023-05-17

## 2023-05-17 NOTE — TELEPHONE ENCOUNTER
Faxed dental clearance to vero at [phone] since dental office said they never received it   We did fax twice to them

## 2023-07-07 ENCOUNTER — APPOINTMENT (OUTPATIENT)
Dept: LAB | Facility: IMAGING CENTER | Age: 53
End: 2023-07-07
Payer: COMMERCIAL

## 2023-07-07 DIAGNOSIS — E55.9 VITAMIN D INSUFFICIENCY: ICD-10-CM

## 2023-07-07 DIAGNOSIS — F41.9 ANXIETY: ICD-10-CM

## 2023-07-07 DIAGNOSIS — E11.9 TYPE 2 DIABETES MELLITUS WITHOUT COMPLICATION, WITHOUT LONG-TERM CURRENT USE OF INSULIN (HCC): ICD-10-CM

## 2023-07-07 LAB
25(OH)D3 SERPL-MCNC: 24.1 NG/ML (ref 30–100)
ALBUMIN SERPL BCP-MCNC: 3.9 G/DL (ref 3.5–5)
ALP SERPL-CCNC: 142 U/L (ref 46–116)
ALT SERPL W P-5'-P-CCNC: 34 U/L (ref 12–78)
ANION GAP SERPL CALCULATED.3IONS-SCNC: 5 MMOL/L
AST SERPL W P-5'-P-CCNC: 26 U/L (ref 5–45)
BASOPHILS # BLD AUTO: 0.04 THOUSANDS/ÂΜL (ref 0–0.1)
BASOPHILS NFR BLD AUTO: 0 % (ref 0–1)
BILIRUB SERPL-MCNC: 0.5 MG/DL (ref 0.2–1)
BUN SERPL-MCNC: 13 MG/DL (ref 5–25)
CALCIUM SERPL-MCNC: 8.4 MG/DL (ref 8.3–10.1)
CHLORIDE SERPL-SCNC: 105 MMOL/L (ref 96–108)
CHOLEST SERPL-MCNC: 152 MG/DL
CO2 SERPL-SCNC: 30 MMOL/L (ref 21–32)
CREAT SERPL-MCNC: 0.73 MG/DL (ref 0.6–1.3)
EOSINOPHIL # BLD AUTO: 0.19 THOUSAND/ÂΜL (ref 0–0.61)
EOSINOPHIL NFR BLD AUTO: 2 % (ref 0–6)
ERYTHROCYTE [DISTWIDTH] IN BLOOD BY AUTOMATED COUNT: 14.5 % (ref 11.6–15.1)
GFR SERPL CREATININE-BSD FRML MDRD: 94 ML/MIN/1.73SQ M
GLUCOSE P FAST SERPL-MCNC: 166 MG/DL (ref 65–99)
HCT VFR BLD AUTO: 40.3 % (ref 34.8–46.1)
HDLC SERPL-MCNC: 54 MG/DL
HGB BLD-MCNC: 12.4 G/DL (ref 11.5–15.4)
IMM GRANULOCYTES # BLD AUTO: 0.03 THOUSAND/UL (ref 0–0.2)
IMM GRANULOCYTES NFR BLD AUTO: 0 % (ref 0–2)
LDLC SERPL CALC-MCNC: 72 MG/DL (ref 0–100)
LYMPHOCYTES # BLD AUTO: 2.99 THOUSANDS/ÂΜL (ref 0.6–4.47)
LYMPHOCYTES NFR BLD AUTO: 31 % (ref 14–44)
MCH RBC QN AUTO: 27.9 PG (ref 26.8–34.3)
MCHC RBC AUTO-ENTMCNC: 30.8 G/DL (ref 31.4–37.4)
MCV RBC AUTO: 91 FL (ref 82–98)
MONOCYTES # BLD AUTO: 0.55 THOUSAND/ÂΜL (ref 0.17–1.22)
MONOCYTES NFR BLD AUTO: 6 % (ref 4–12)
NEUTROPHILS # BLD AUTO: 5.85 THOUSANDS/ÂΜL (ref 1.85–7.62)
NEUTS SEG NFR BLD AUTO: 61 % (ref 43–75)
NRBC BLD AUTO-RTO: 0 /100 WBCS
PLATELET # BLD AUTO: 254 THOUSANDS/UL (ref 149–390)
PMV BLD AUTO: 11.6 FL (ref 8.9–12.7)
POTASSIUM SERPL-SCNC: 4.4 MMOL/L (ref 3.5–5.3)
PROT SERPL-MCNC: 8.6 G/DL (ref 6.4–8.4)
RBC # BLD AUTO: 4.45 MILLION/UL (ref 3.81–5.12)
SODIUM SERPL-SCNC: 140 MMOL/L (ref 135–147)
T4 FREE SERPL-MCNC: 0.88 NG/DL (ref 0.61–1.12)
TRIGL SERPL-MCNC: 132 MG/DL
TSH SERPL DL<=0.05 MIU/L-ACNC: 0.24 UIU/ML (ref 0.45–4.5)
WBC # BLD AUTO: 9.65 THOUSAND/UL (ref 4.31–10.16)

## 2023-07-07 PROCEDURE — 36415 COLL VENOUS BLD VENIPUNCTURE: CPT

## 2023-07-07 PROCEDURE — 83036 HEMOGLOBIN GLYCOSYLATED A1C: CPT

## 2023-07-07 PROCEDURE — 84443 ASSAY THYROID STIM HORMONE: CPT

## 2023-07-07 PROCEDURE — 84439 ASSAY OF FREE THYROXINE: CPT

## 2023-07-07 PROCEDURE — 80061 LIPID PANEL: CPT

## 2023-07-07 PROCEDURE — 80053 COMPREHEN METABOLIC PANEL: CPT

## 2023-07-07 PROCEDURE — 82306 VITAMIN D 25 HYDROXY: CPT

## 2023-07-07 PROCEDURE — 85025 COMPLETE CBC W/AUTO DIFF WBC: CPT

## 2023-07-07 RX ORDER — PAROXETINE HYDROCHLORIDE 20 MG/1
20 TABLET, FILM COATED ORAL DAILY
Qty: 90 TABLET | Refills: 0 | Status: SHIPPED | OUTPATIENT
Start: 2023-07-07

## 2023-07-07 NOTE — TELEPHONE ENCOUNTER
Pharmacy requested Paxil. Pt last seen 4/19/23, has appt 7/19/2023. Refilled to pharmacy as directed.

## 2023-07-09 LAB
EST. AVERAGE GLUCOSE BLD GHB EST-MCNC: 146 MG/DL
HBA1C MFR BLD: 6.7 %

## 2023-07-19 ENCOUNTER — OFFICE VISIT (OUTPATIENT)
Dept: FAMILY MEDICINE CLINIC | Facility: CLINIC | Age: 53
End: 2023-07-19
Payer: COMMERCIAL

## 2023-07-19 ENCOUNTER — TELEPHONE (OUTPATIENT)
Dept: FAMILY MEDICINE CLINIC | Facility: CLINIC | Age: 53
End: 2023-07-19

## 2023-07-19 VITALS
SYSTOLIC BLOOD PRESSURE: 114 MMHG | RESPIRATION RATE: 16 BRPM | BODY MASS INDEX: 31.8 KG/M2 | WEIGHT: 162 LBS | HEART RATE: 104 BPM | DIASTOLIC BLOOD PRESSURE: 70 MMHG | TEMPERATURE: 97 F | HEIGHT: 60 IN | OXYGEN SATURATION: 97 %

## 2023-07-19 DIAGNOSIS — E55.9 VITAMIN D INSUFFICIENCY: Primary | ICD-10-CM

## 2023-07-19 DIAGNOSIS — F32.89 OTHER DEPRESSION: ICD-10-CM

## 2023-07-19 DIAGNOSIS — E55.9 VITAMIN D INSUFFICIENCY: ICD-10-CM

## 2023-07-19 DIAGNOSIS — E03.8 OTHER SPECIFIED HYPOTHYROIDISM: ICD-10-CM

## 2023-07-19 DIAGNOSIS — E11.69 TYPE 2 DIABETES MELLITUS WITH OTHER SPECIFIED COMPLICATION, WITHOUT LONG-TERM CURRENT USE OF INSULIN (HCC): ICD-10-CM

## 2023-07-19 DIAGNOSIS — E78.49 OTHER HYPERLIPIDEMIA: ICD-10-CM

## 2023-07-19 DIAGNOSIS — I10 ESSENTIAL HYPERTENSION: ICD-10-CM

## 2023-07-19 DIAGNOSIS — E11.9 TYPE 2 DIABETES MELLITUS WITHOUT COMPLICATION, WITHOUT LONG-TERM CURRENT USE OF INSULIN (HCC): Primary | ICD-10-CM

## 2023-07-19 PROCEDURE — 99214 OFFICE O/P EST MOD 30 MIN: CPT | Performed by: FAMILY MEDICINE

## 2023-07-19 RX ORDER — LEVOTHYROXINE SODIUM 0.07 MG/1
75 TABLET ORAL DAILY
Qty: 90 TABLET | Refills: 0 | Status: SHIPPED | OUTPATIENT
Start: 2023-07-19

## 2023-07-19 RX ORDER — ERGOCALCIFEROL 1.25 MG/1
50000 CAPSULE ORAL WEEKLY
Qty: 12 CAPSULE | Refills: 1 | Status: SHIPPED | OUTPATIENT
Start: 2023-07-19

## 2023-07-19 NOTE — ASSESSMENT & PLAN NOTE
Lab Results   Component Value Date    HGBA1C 6.7 (H) 07/07/2023   most recent A1C 6.7   Continue metformin 1000mg BID with meals  Discussed importance of diet and exercise, discussed lower sugar, low carb options

## 2023-07-19 NOTE — ASSESSMENT & PLAN NOTE
TSH low, T4 normal  Reduce dose of levothroxine to 50 mcg  Take levothyroxine daily before breakfast   Recheck TSH/T4 in 3 months

## 2023-07-19 NOTE — TELEPHONE ENCOUNTER
Patient was seen today and caretaker called and said the doctor forgot to call in the weekly vitamin D as discussed

## 2023-07-19 NOTE — ASSESSMENT & PLAN NOTE
Well controlled - 114/70 in the office today  Continue lisinopril 10mg daily for HTN  Discussed importance of diet and exercise

## 2023-07-19 NOTE — PROGRESS NOTES
Name: Patience Biggs      : 1970      MRN: [id number]  Encounter Provider: Lilian Avery MD  Encounter Date: 2023   Encounter department: [facility]     1. Type 2 diabetes mellitus without complication, without long-term current use of insulin (formerly [facility])  Assessment & Plan:    Lab Results   Component Value Date    HGBA1C 6.7 (H) 2023   most recent A1C 6.7   Continue metformin 1000mg BID with meals  Discussed importance of diet and exercise, discussed lower sugar, low carb options         Orders:  -     metFORMIN (GLUCOPHAGE) 1000 MG tablet; Take 1 tablet (1,000 mg total) by mouth 2 (two) times a day with meals At 8 am and 8 pm    2. Type 2 diabetes mellitus with other specified complication, without long-term current use of insulin (formerly [facility])  Assessment & Plan:    Lab Results   Component Value Date    HGBA1C 6.7 (H) 2023   most recent A1C 6.7   Continue metformin 1000mg BID with meals  Discussed importance of diet and exercise, discussed lower sugar, low carb options         Orders:  -     Ambulatory referral to clinical pharmacy; Future  -     Hemoglobin A1C; Future    3. Essential hypertension  Assessment & Plan:  Well controlled - 114/70 in the office today  Continue lisinopril 10mg daily for HTN  Discussed importance of diet and exercise       4. Other hyperlipidemia  Assessment & Plan:  Lipids well controlled  Continue simvastatin 20mg tablet once daily at HS          5. Other specified hypothyroidism  Assessment & Plan:  TSH low, T4 normal  Reduce dose of levothroxine to 50 mcg  Take levothyroxine daily before breakfast   Recheck TSH/T4 in 3 months     Orders:  -     levothyroxine 75 mcg tablet; Take 1 tablet (75 mcg total) by mouth daily  -     TSH, 3rd generation with Free T4 reflex; Future    6.  Vitamin D insufficiency  Assessment & Plan:  Currently taking calcium citrate-vitamin D 250mg-2.5mcg  Vit D level low  Start ergocalciferol 1.25mg capsule once weekly   Recheck Vit D level in 3 months      Orders:  -     Vitamin D 25 hydroxy; Future    7. Other depression  Assessment & Plan:  Well controlled  Continue paroxetine 20mg tablet once daily              Subjective     Pt is a 46 yo F with PMH of HTN, HLD, depression, hypothyroidism, T2DM who presents to the office today with her caregiver for 3 month follow up. Pt is taking her medications as prescribed and does not have any side effects of medications. ROS negative. Denies any fevers, chills, recent illness, cough, congestion, SOB, chest pain, leg swelling, abdominal pain, N/V/D, constipation, blood in stool. Last pap and mammogram in March 2023, colonoscopy scheduled for next week. Discussed importance of diet modification and exercise for T2DM control. Discussed lower sugar/carb options and portion control. Follow up in 3 months with lab work. Review of Systems   Constitutional: Negative for chills, fever and unexpected weight change. HENT: Negative for congestion, ear pain, rhinorrhea and sore throat. Eyes: Negative for pain and visual disturbance. Respiratory: Negative for cough, shortness of breath and wheezing. Cardiovascular: Negative for chest pain, palpitations and leg swelling. Gastrointestinal: Negative for abdominal pain, constipation, diarrhea, nausea and vomiting. Genitourinary: Negative for dysuria and hematuria. Musculoskeletal: Negative for arthralgias and back pain. Skin: Negative for color change and rash. Neurological: Negative for dizziness, seizures, syncope and headaches. All other systems reviewed and are negative.       Past Medical History:   Diagnosis Date   • Diabetes mellitus ([address])    • Disease of thyroid gland    • Hyperlipidemia    • Hypertension    • Intellectual delay    • Seizures ([address])     h/o, no longer on meds   • Tooth decay      Past Surgical History:   Procedure Laterality Date   • NO PAST SURGERIES       Family History Problem Relation Age of Onset   • Dementia Mother    • Stroke Mother    • Colon cancer Father    • Mental illness Brother    • No Known Problems Brother    • Ovarian cancer Cousin    • Breast cancer Neg Hx    • Uterine cancer Neg Hx      Social History     Socioeconomic History   • Marital status: Single     Spouse name: None   • Number of children: None   • Years of education: None   • Highest education level: None   Occupational History   • None   Tobacco Use   • Smoking status: Never   • Smokeless tobacco: Never   • Tobacco comments:     No passive smoke exposure   Vaping Use   • Vaping Use: Never used   Substance and Sexual Activity   • Alcohol use: Never   • Drug use: No   • Sexual activity: Never   Other Topics Concern   • None   Social History Narrative   • None     Social Determinants of Health     Financial Resource Strain: Not on file   Food Insecurity: Not on file   Transportation Needs: Not on file   Physical Activity: Not on file   Stress: Not on file   Social Connections: Not on file   Intimate Partner Violence: Not on file   Housing Stability: Not on file     Current Outpatient Medications on File Prior to Visit   Medication Sig   • Calcium Citrate-Vitamin D 250 mg-2.5 mcg tablet Take 1 tablet by mouth 2 (two) times a day   • clotrimazole-betamethasone (LOTRISONE) 1-0.05 % cream Apply topically 2 (two) times a day as needed (rash)   • lisinopril (ZESTRIL) 10 mg tablet Take 1 tablet (10 mg total) by mouth daily   • PARoxetine (PAXIL) 20 mg tablet Take 1 tablet (20 mg total) by mouth daily   • simvastatin (ZOCOR) 20 mg tablet Take 1 tablet (20 mg total) by mouth daily at bedtime   • [DISCONTINUED] levothyroxine 88 mcg tablet Take 1 tablet (88 mcg total) by mouth daily   • [DISCONTINUED] metFORMIN (GLUCOPHAGE) 1000 MG tablet Take 1 tablet (1,000 mg total) by mouth 2 (two) times a day with meals At 8 am and 8 pm   • econazole nitrate 1 % cream Every 12 hours (Patient not taking: Reported on 11/22/2022) • ergocalciferol (ERGOCALCIFEROL) 1.25 MG (67552 UT) capsule Take 1 capsule (50,000 Units total) by mouth once a week (Patient not taking: Reported on 7/6/2021)     Allergies   Allergen Reactions   • No Known Allergies      Immunization History   Administered Date(s) Administered   • COVID-19 Moderna Vac BIVALENT 12 Yr+ IM (BOOSTER ONLY) 0.5 ML 01/27/2023   • COVID-19 PFIZER VACCINE 0.3 ML IM 11/11/2021, 12/09/2021   • INFLUENZA 08/08/2014, 08/27/2015, 11/01/2016, 09/12/2020   • Influenza Split 02/01/2013   • Influenza, injectable, quadrivalent, preservative free 0.5 mL 09/12/2020   • Influenza, recombinant, quadrivalent,injectable, preservative free 11/22/2022   • Influenza, seasonal, injectable 08/27/2015   • Influenza, seasonal, injectable, preservative free 09/16/2013   • Pneumococcal Polysaccharide PPV23 02/01/2013   • Tdap 11/01/2016       Objective     /70 (BP Location: Left arm, Patient Position: Sitting, Cuff Size: Adult)   Pulse 104   Temp (!) 97 °F (36.1 °C) (Tympanic)   Resp 16   Ht 5' (1.524 m)   Wt 73.5 kg (162 lb)   SpO2 97%   BMI 31.64 kg/m²     Physical Exam  Vitals and nursing note reviewed. Constitutional:       General: She is not in acute distress. Appearance: Normal appearance. She is not ill-appearing, toxic-appearing or diaphoretic. HENT:      Head: Normocephalic and atraumatic. Nose: Nose normal.      Mouth/Throat:      Mouth: Mucous membranes are moist.      Pharynx: Oropharynx is clear. Eyes:      Extraocular Movements: Extraocular movements intact. Conjunctiva/sclera: Conjunctivae normal.      Pupils: Pupils are equal, round, and reactive to light. Cardiovascular:      Rate and Rhythm: Normal rate and regular rhythm. Pulses: Normal pulses. Heart sounds: Normal heart sounds. No murmur heard. No friction rub. No gallop. Pulmonary:      Effort: Pulmonary effort is normal. No respiratory distress. Breath sounds: Normal breath sounds. No wheezing, rhonchi or rales. Abdominal:      Palpations: Abdomen is soft. Musculoskeletal:      Right lower leg: No edema. Left lower leg: No edema. Skin:     General: Skin is warm and dry. Capillary Refill: Capillary refill takes less than 2 seconds. Neurological:      General: No focal deficit present. Mental Status: She is alert. Mental status is at baseline.       Gait: Gait normal.   Psychiatric:         Mood and Affect: Mood normal.         Behavior: Behavior normal.       Shayy Luke MD

## 2023-07-19 NOTE — TELEPHONE ENCOUNTER
I sent rx for vitamin d weekly as requested.  I did check her vitamin d level and it was low prior to sending the medciation

## 2023-07-19 NOTE — ASSESSMENT & PLAN NOTE
Currently taking calcium citrate-vitamin D 250mg-2.5mcg  Vit D level low  Start ergocalciferol 1.25mg capsule once weekly   Recheck Vit D level in 3 months

## 2023-07-25 ENCOUNTER — TELEPHONE (OUTPATIENT)
Dept: FAMILY MEDICINE CLINIC | Facility: CLINIC | Age: 53
End: 2023-07-25

## 2023-07-25 DIAGNOSIS — E11.9 TYPE 2 DIABETES MELLITUS WITHOUT COMPLICATION, WITHOUT LONG-TERM CURRENT USE OF INSULIN (HCC): Primary | ICD-10-CM

## 2023-07-25 NOTE — TELEPHONE ENCOUNTER
[address] Dinesh Riojas, PharmD, BCPS, [place]     Reason for Outreach: Patient referred to clinical pharmacist by Dayron Gordon MD for Medication Adherence for Diabetic Care.     Outreach attempts to patient to introduce services and schedule an appointment x 2.     Patient unavailable at time of calls. Left voicemails with contact information for return call. Will send letter.      Measure Outcome Adherence Score:   · Biguanides: 34%   · Sulfonylureas: N/A  · TZDs: N/A  · DPP-4: N/A  · GLP-1: N/A  · Meglitinides: N/A  · SGLT2: 31%

## 2023-07-25 NOTE — LETTER
RE: Uzma Carlos -- MR#: [id number]   September 11, 2023    [address] [facility]  [id number]    Dear Ms. Mino Varela,     At [facility], we understand the important role your medications play in maintaining your health. If you're managing conditions such as high blood pressure, diabetes, or high cholesterol, taking your medications correctly can make a big difference. It can help keep these conditions from getting worse and might even prevent a hospital visit. The Centers for Disease Control (CDC) says that one out of every five new medicines is never picked up from the pharmacy. And half of the time, the medicines that are picked up are not taken correctly. For most medicines, you need to take them 80% of the time to get the most help from them. That means if you forget to take them two days each week, they might not work as well. We know it can be easy to get off track, especially if you're taking several medications each day. We encourage you to bring ALL your medication bottles (even the ones you can buy without a prescription) to your clinic visits. That way your provider can discuss your medications with you and make sure you're taking them the best way.     Here are some reasons it might be hard to take medicines:  Forgetting to take them  Worrying about side effects  The cost is too high  Trouble getting to the pharmacy  It takes too long to get refills  You feel like you take too many medicines  You feel like your medicine isn't needed or isn't working  You don't know why you take it or how to take it    And here are some ways to make it easier:  Use a pill box to keep your medicines organized  Set reminders to take your pills  Ask family or friends for help  Talk to other people who take the same medicines  Use helpful services at your pharmacy like:  Talking to the pharmacist  80- or 100-day supplies of your medicines  Automatic refills to avoid gaps in medication  Getting reminders when it's time for a refill  Having your medicine mailed or delivered to your house  Getting all your medicines refilled at the same time each month (med synchronization)    Remember, we are here to help you. Our providers and care teams are committed to understanding your needs and working together to find a medication regimen that works for you. We also have a team of clinical pharmacy professionals working to make sure you get the right medicines in the right way. If you're having trouble taking your medicines the right way, don't wait for your next visit. Call us or send us a message today. We care about you and your health. Thank you for being a valued patient of [facility]!       Sincerely,        Dinesh Riojas, Pharmacist  Clinical Integration Pharmacist  [place]

## 2023-08-07 ENCOUNTER — TELEPHONE (OUTPATIENT)
Dept: FAMILY MEDICINE CLINIC | Facility: CLINIC | Age: 53
End: 2023-08-07

## 2023-08-07 DIAGNOSIS — E03.8 OTHER SPECIFIED HYPOTHYROIDISM: ICD-10-CM

## 2023-08-07 RX ORDER — LEVOTHYROXINE SODIUM 0.07 MG/1
75 TABLET ORAL DAILY
Qty: 90 TABLET | Refills: 0 | Status: SHIPPED | OUTPATIENT
Start: 2023-08-07

## 2023-09-12 ENCOUNTER — TELEPHONE (OUTPATIENT)
Dept: FAMILY MEDICINE CLINIC | Facility: CLINIC | Age: 53
End: 2023-09-12

## 2023-09-12 DIAGNOSIS — E78.49 OTHER HYPERLIPIDEMIA: ICD-10-CM

## 2023-09-12 DIAGNOSIS — F41.9 ANXIETY: ICD-10-CM

## 2023-09-12 DIAGNOSIS — I10 ESSENTIAL HYPERTENSION: ICD-10-CM

## 2023-09-12 DIAGNOSIS — E03.8 OTHER SPECIFIED HYPOTHYROIDISM: ICD-10-CM

## 2023-09-12 RX ORDER — PAROXETINE HYDROCHLORIDE 20 MG/1
20 TABLET, FILM COATED ORAL DAILY
Qty: 90 TABLET | Refills: 0 | Status: SHIPPED | OUTPATIENT
Start: 2023-09-12

## 2023-09-12 RX ORDER — SIMVASTATIN 20 MG
20 TABLET ORAL
Qty: 90 TABLET | Refills: 1 | Status: SHIPPED | OUTPATIENT
Start: 2023-09-12

## 2023-09-12 RX ORDER — LEVOTHYROXINE SODIUM 0.07 MG/1
75 TABLET ORAL DAILY
Qty: 90 TABLET | Refills: 0 | Status: SHIPPED | OUTPATIENT
Start: 2023-09-12

## 2023-09-12 RX ORDER — LISINOPRIL 10 MG/1
10 TABLET ORAL DAILY
Qty: 100 TABLET | Refills: 1 | Status: SHIPPED | OUTPATIENT
Start: 2023-09-12

## 2023-09-12 NOTE — TELEPHONE ENCOUNTER
Hi, this is Cherri. I'm calling about Ashley Arana, date of birth [birth year]. I was told she needs refills on her medications, I guess on all of them except the PRN, the clotrimazole slash betamethasone. We don't need a refill on that and on her metformin. If you guys could please call that in as twice a day with food at 8:00 AM and 8:00 PM. Doctor Gagan Santo has said that she can take it at 8:00 PM because I'm not home from work by 4:00 or 5:00 to give it to her at that time However, the pharmacy keeps putting it down as a 4:00 Med, so I need you to specify 8:00 AM and 8:00 PM for that Med please. If you need to go over her meds, you can call me back. If you don't, then if you can just call the refills into the pharmacy. It's [address]. My Sherryn Reveal is [phone] and my phone number is [phone] Thank you.

## 2023-09-26 DIAGNOSIS — E11.9 TYPE 2 DIABETES MELLITUS WITHOUT COMPLICATION, WITHOUT LONG-TERM CURRENT USE OF INSULIN (HCC): ICD-10-CM

## 2023-09-26 DIAGNOSIS — E55.9 VITAMIN D INSUFFICIENCY: ICD-10-CM

## 2023-09-26 NOTE — TELEPHONE ENCOUNTER
Fax from [phone] [place] [address] for refill of metformin 1000mg and calcium citrate vitamin D

## 2023-10-11 ENCOUNTER — APPOINTMENT (OUTPATIENT)
Dept: LAB | Facility: IMAGING CENTER | Age: 53
End: 2023-10-11
Payer: COMMERCIAL

## 2023-10-11 DIAGNOSIS — E11.69 TYPE 2 DIABETES MELLITUS WITH OTHER SPECIFIED COMPLICATION, WITHOUT LONG-TERM CURRENT USE OF INSULIN (HCC): ICD-10-CM

## 2023-10-11 DIAGNOSIS — E03.8 OTHER SPECIFIED HYPOTHYROIDISM: ICD-10-CM

## 2023-10-11 DIAGNOSIS — E55.9 VITAMIN D INSUFFICIENCY: ICD-10-CM

## 2023-10-11 LAB
25(OH)D3 SERPL-MCNC: 39.3 NG/ML (ref 30–100)
EST. AVERAGE GLUCOSE BLD GHB EST-MCNC: 151 MG/DL
HBA1C MFR BLD: 6.9 %
T4 FREE SERPL-MCNC: 0.82 NG/DL (ref 0.61–1.12)
TSH SERPL DL<=0.05 MIU/L-ACNC: 5.89 UIU/ML (ref 0.45–4.5)

## 2023-10-11 PROCEDURE — 82306 VITAMIN D 25 HYDROXY: CPT

## 2023-10-11 PROCEDURE — 84443 ASSAY THYROID STIM HORMONE: CPT

## 2023-10-11 PROCEDURE — 84439 ASSAY OF FREE THYROXINE: CPT

## 2023-10-11 PROCEDURE — 36415 COLL VENOUS BLD VENIPUNCTURE: CPT

## 2023-10-11 PROCEDURE — 83036 HEMOGLOBIN GLYCOSYLATED A1C: CPT

## 2023-10-16 ENCOUNTER — OFFICE VISIT (OUTPATIENT)
Dept: FAMILY MEDICINE CLINIC | Facility: CLINIC | Age: 53
End: 2023-10-16
Payer: COMMERCIAL

## 2023-10-16 VITALS
WEIGHT: 163 LBS | OXYGEN SATURATION: 98 % | SYSTOLIC BLOOD PRESSURE: 114 MMHG | TEMPERATURE: 96.2 F | DIASTOLIC BLOOD PRESSURE: 72 MMHG | HEART RATE: 89 BPM | BODY MASS INDEX: 32 KG/M2 | HEIGHT: 60 IN | RESPIRATION RATE: 16 BRPM

## 2023-10-16 DIAGNOSIS — N39.498 OTHER URINARY INCONTINENCE: ICD-10-CM

## 2023-10-16 DIAGNOSIS — E78.49 OTHER HYPERLIPIDEMIA: ICD-10-CM

## 2023-10-16 DIAGNOSIS — E03.8 OTHER SPECIFIED HYPOTHYROIDISM: ICD-10-CM

## 2023-10-16 DIAGNOSIS — E11.9 TYPE 2 DIABETES MELLITUS WITHOUT COMPLICATION, WITHOUT LONG-TERM CURRENT USE OF INSULIN (HCC): Primary | ICD-10-CM

## 2023-10-16 DIAGNOSIS — I10 ESSENTIAL HYPERTENSION: ICD-10-CM

## 2023-10-16 DIAGNOSIS — R35.0 URINARY FREQUENCY: ICD-10-CM

## 2023-10-16 LAB
SL AMB  POCT GLUCOSE, UA: ABNORMAL
SL AMB LEUKOCYTE ESTERASE,UA: POSITIVE
SL AMB POCT BILIRUBIN,UA: ABNORMAL
SL AMB POCT BLOOD,UA: ABNORMAL
SL AMB POCT CLARITY,UA: CLEAR
SL AMB POCT COLOR,UA: YELLOW
SL AMB POCT KETONES,UA: POSITIVE
SL AMB POCT NITRITE,UA: ABNORMAL
SL AMB POCT PH,UA: 6
SL AMB POCT SPECIFIC GRAVITY,UA: 1.02
SL AMB POCT URINE PROTEIN: ABNORMAL
SL AMB POCT UROBILINOGEN: ABNORMAL

## 2023-10-16 PROCEDURE — 87086 URINE CULTURE/COLONY COUNT: CPT | Performed by: FAMILY MEDICINE

## 2023-10-16 PROCEDURE — 81002 URINALYSIS NONAUTO W/O SCOPE: CPT | Performed by: FAMILY MEDICINE

## 2023-10-16 PROCEDURE — 99214 OFFICE O/P EST MOD 30 MIN: CPT | Performed by: FAMILY MEDICINE

## 2023-10-16 RX ORDER — LEVOTHYROXINE SODIUM 88 UG/1
88 TABLET ORAL DAILY
Qty: 90 TABLET | Refills: 0 | Status: SHIPPED | OUTPATIENT
Start: 2023-10-16

## 2023-10-17 ENCOUNTER — VBI (OUTPATIENT)
Dept: ADMINISTRATIVE | Facility: OTHER | Age: 53
End: 2023-10-17

## 2023-10-17 LAB — BACTERIA UR CULT: NORMAL

## 2023-10-18 ENCOUNTER — TELEPHONE (OUTPATIENT)
Dept: FAMILY MEDICINE CLINIC | Facility: CLINIC | Age: 53
End: 2023-10-18

## 2023-10-20 PROBLEM — R32 ABSENCE OF BLADDER CONTINENCE: Status: ACTIVE | Noted: 2023-10-20

## 2023-10-20 NOTE — ASSESSMENT & PLAN NOTE
Not well controlled. I am going to increase levothyroxine to 88 mcg daily. Discussed with possible side effect. We will continue to monitor TSH and free T4. Come back in 2 months.

## 2023-10-20 NOTE — PROGRESS NOTES
Name: Abdullahi Tracy      : 1970      MRN: [id number]  Encounter Provider: Karina Conde MD  Encounter Date: 10/16/2023   Encounter department: [facility]     1. Type 2 diabetes mellitus without complication, without long-term current use of insulin (HCC)  Assessment & Plan:  Well-controlled but has been increasing every visit. I am going to start her on Mounjaro 2.5 mg weekly. Discussed with possible side effect. Discussed about low-carb diet and regular exercise. Come back in 2 months. Lab Results   Component Value Date    HGBA1C 6.9 (H) 10/11/2023       Orders:  -     tirzepatide 2.5 MG/0.5ML; Inject 0.5 mL (2.5 mg total) under the skin every 7 days  -     Hemoglobin A1C; Future; Expected date: 10/16/2023  -     TSH, 3rd generation with Free T4 reflex; Future; Expected date: 10/16/2023  -     Comprehensive metabolic panel; Future; Expected date: 10/16/2023  -     Lipid Panel with Direct LDL reflex; Future    2. Other urinary incontinence  Assessment & Plan:  I am going to check UA and C&S. Referral to see urogynecology. Orders:  -     Ambulatory Referral to Urogynecology; Future    3. Other specified hypothyroidism  Assessment & Plan:  Not well controlled. I am going to increase levothyroxine to 88 mcg daily. Discussed with possible side effect. We will continue to monitor TSH and free T4. Come back in 2 months. Orders:  -     levothyroxine 88 mcg tablet; Take 1 tablet (88 mcg total) by mouth daily    4. Urinary frequency  -     Urine culture; Future  -     POCT urine dip  -     Urine culture    5. Essential hypertension  Assessment & Plan:  Well-controlled on lisinopril 10 mg daily. Continue same. We will continue to monitor. 6. Other hyperlipidemia  Assessment & Plan:  Well-controlled. Continue on Zocor 20 mg daily. Continue to follow low-fat diet. Continue to monitor fasting lipid profile.       7. BMI 31.0-31.9,adult           Subjective     Is here for follow-up multiple medical problems. She has been taking her medications. She denies any side effect to her medications. Her A1c came back elevated at 6.9. Her TSH came back elevated. She is on levothyroxine 75 mcg daily. She has been having problems urinary incontinence. Review of Systems   Constitutional:  Negative for chills and fever. HENT:  Negative for trouble swallowing. Eyes:  Negative for visual disturbance. Respiratory:  Negative for cough and shortness of breath. Cardiovascular:  Negative for chest pain, palpitations and leg swelling. Gastrointestinal:  Negative for abdominal pain, constipation and diarrhea. Endocrine: Negative for cold intolerance and heat intolerance. Genitourinary:  Positive for frequency. Urinary incontinence   Musculoskeletal:  Negative for gait problem. Skin:  Negative for rash. Neurological:  Negative for dizziness, tremors, seizures and headaches. Hematological:  Negative for adenopathy. Psychiatric/Behavioral:  Negative for behavioral problems.         Past Medical History:   Diagnosis Date   • Diabetes mellitus ([address])    • Disease of thyroid gland    • Hyperlipidemia    • Hypertension    • Intellectual delay    • Seizures ([address])     h/o, no longer on meds   • Tooth decay      Past Surgical History:   Procedure Laterality Date   • NO PAST SURGERIES       Family History   Problem Relation Age of Onset   • Dementia Mother    • Stroke Mother    • Colon cancer Father    • Mental illness Brother    • No Known Problems Brother    • Ovarian cancer Cousin    • Breast cancer Neg Hx    • Uterine cancer Neg Hx      Social History     Socioeconomic History   • Marital status: Single     Spouse name: None   • Number of children: None   • Years of education: None   • Highest education level: None   Occupational History   • None   Tobacco Use   • Smoking status: Never   • Smokeless tobacco: Never   • Tobacco comments:     No passive smoke exposure   Vaping Use   • Vaping Use: Never used   Substance and Sexual Activity   • Alcohol use: Never   • Drug use: No   • Sexual activity: Never   Other Topics Concern   • None   Social History Narrative   • None     Social Determinants of Health     Financial Resource Strain: Not on file   Food Insecurity: Not on file   Transportation Needs: Not on file   Physical Activity: Not on file   Stress: Not on file   Social Connections: Not on file   Intimate Partner Violence: Not on file   Housing Stability: Not on file     Current Outpatient Medications on File Prior to Visit   Medication Sig   • Calcium Citrate-Vitamin D 250 mg-2.5 mcg tablet Take 1 tablet by mouth 2 (two) times a day   • clotrimazole-betamethasone (LOTRISONE) 1-0.05 % cream Apply topically 2 (two) times a day as needed (rash)   • ergocalciferol (VITAMIN D2) 50,000 units Take 1 capsule (50,000 Units total) by mouth once a week   • lisinopril (ZESTRIL) 10 mg tablet Take 1 tablet (10 mg total) by mouth daily   • metFORMIN (GLUCOPHAGE) 1000 MG tablet Take 1 tablet (1,000 mg total) by mouth 2 (two) times a day with meals At 8 am and 8 pm   • PARoxetine (PAXIL) 20 mg tablet Take 1 tablet (20 mg total) by mouth daily   • simvastatin (ZOCOR) 20 mg tablet Take 1 tablet (20 mg total) by mouth daily at bedtime   • econazole nitrate 1 % cream Every 12 hours (Patient not taking: Reported on 11/22/2022)   • ergocalciferol (ERGOCALCIFEROL) 1.25 MG (77500 UT) capsule Take 1 capsule (50,000 Units total) by mouth once a week (Patient not taking: Reported on 7/6/2021)     Allergies   Allergen Reactions   • No Known Allergies      Immunization History   Administered Date(s) Administered   • COVID-19 Moderna Vac BIVALENT 12 Yr+ IM 0.5 ML 01/27/2023, 05/05/2023   • COVID-19 PFIZER VACCINE 0.3 ML IM 11/11/2021, 12/09/2021   • COVID-19 Pfizer mRNA vacc PF mira-sucrose 12 yr and older ([place]) 10/06/2023   • INFLUENZA 08/08/2014, 08/27/2015, 11/01/2016, 09/12/2020, 10/06/2023   • Influenza Split 02/01/2013   • Influenza, injectable, quadrivalent, preservative free 0.5 mL 09/12/2020   • Influenza, recombinant, quadrivalent,injectable, preservative free 11/22/2022   • Influenza, seasonal, injectable 08/27/2015   • Influenza, seasonal, injectable, preservative free 09/16/2013   • Pneumococcal Polysaccharide PPV23 02/01/2013   • Tdap 11/01/2016   • Zoster Vaccine Recombinant 05/05/2023, 10/06/2023       Objective     /72 (BP Location: Left arm, Patient Position: Sitting, Cuff Size: Large)   Pulse 89   Temp (!) 96.2 °F (35.7 °C) (Tympanic)   Resp 16   Ht 5' (1.524 m)   Wt 73.9 kg (163 lb)   SpO2 98%   BMI 31.83 kg/m²     Physical Exam  Vitals and nursing note reviewed. Constitutional:       Appearance: She is well-developed. HENT:      Head: Normocephalic and atraumatic. Eyes:      Pupils: Pupils are equal, round, and reactive to light. Cardiovascular:      Rate and Rhythm: Normal rate and regular rhythm. Heart sounds: Normal heart sounds. Pulmonary:      Effort: Pulmonary effort is normal.      Breath sounds: Normal breath sounds. Abdominal:      General: Bowel sounds are normal.      Palpations: Abdomen is soft. Musculoskeletal:      Cervical back: Normal range of motion and neck supple. Lymphadenopathy:      Cervical: No cervical adenopathy. Skin:     General: Skin is warm. Neurological:      Mental Status: She is alert and oriented to person, place, and time. Katharina Brunner, MD  BMI Counseling: Body mass index is 31.83 kg/m². The BMI is above normal. Nutrition recommendations include reducing portion sizes, decreasing overall calorie intake, and 3-5 servings of fruits/vegetables daily. Exercise recommendations include moderate aerobic physical activity for 150 minutes/week.

## 2023-10-20 NOTE — ASSESSMENT & PLAN NOTE
Well-controlled. Continue on Zocor 20 mg daily. Continue to follow low-fat diet. Continue to monitor fasting lipid profile.

## 2023-10-20 NOTE — TELEPHONE ENCOUNTER
Per Zoila cruz.    Formulary: Skinny Hardin Jude Hoh, Dywzojn0tt/5ml(0.25or 0.5mg) Bydureon  Please advise

## 2023-10-20 NOTE — ASSESSMENT & PLAN NOTE
Well-controlled but has been increasing every visit. I am going to start her on Mounjaro 2.5 mg weekly. Discussed with possible side effect. Discussed about low-carb diet and regular exercise. Come back in 2 months.   Lab Results   Component Value Date    HGBA1C 6.9 (H) 10/11/2023

## 2023-10-25 ENCOUNTER — TELEPHONE (OUTPATIENT)
Dept: FAMILY MEDICINE CLINIC | Facility: CLINIC | Age: 53
End: 2023-10-25

## 2023-10-25 DIAGNOSIS — E11.9 TYPE 2 DIABETES MELLITUS WITHOUT COMPLICATION, WITHOUT LONG-TERM CURRENT USE OF INSULIN (HCC): Primary | ICD-10-CM

## 2023-10-30 ENCOUNTER — HOSPITAL ENCOUNTER (OUTPATIENT)
Dept: RADIOLOGY | Age: 53
Discharge: HOME | End: 2023-10-30
Payer: COMMERCIAL

## 2023-10-30 ENCOUNTER — APPOINTMENT (OUTPATIENT)
Dept: LAB | Age: 53
End: 2023-10-30
Payer: COMMERCIAL

## 2023-10-30 DIAGNOSIS — N93.9 VAGINAL BLEEDING: ICD-10-CM

## 2023-10-30 DIAGNOSIS — N93.8 DUB (DYSFUNCTIONAL UTERINE BLEEDING): ICD-10-CM

## 2023-10-30 DIAGNOSIS — N97.0 FEMALE INFERTILITY ASSOCIATED WITH ANOVULATION: ICD-10-CM

## 2023-10-30 LAB
FSH SERPL-ACNC: 19.4 MIU/ML
LH SERPL-ACNC: 13.9 MIU/ML
PROLACTIN SERPL-MCNC: 5.45 NG/ML (ref 2.74–19.64)

## 2023-10-30 PROCEDURE — 36415 COLL VENOUS BLD VENIPUNCTURE: CPT

## 2023-10-30 PROCEDURE — 84146 ASSAY OF PROLACTIN: CPT

## 2023-10-30 PROCEDURE — 76856 US EXAM PELVIC COMPLETE: CPT

## 2023-10-30 PROCEDURE — 82670 ASSAY OF TOTAL ESTRADIOL: CPT

## 2023-10-30 PROCEDURE — 83001 ASSAY OF GONADOTROPIN (FSH): CPT

## 2023-10-30 PROCEDURE — 76830 TRANSVAGINAL US NON-OB: CPT

## 2023-10-30 PROCEDURE — 83002 ASSAY OF GONADOTROPIN (LH): CPT

## 2023-10-31 LAB — ESTRADIOL SERPL-MCNC: 19.4 PG/ML

## 2023-11-20 DIAGNOSIS — E11.9 TYPE 2 DIABETES MELLITUS WITHOUT COMPLICATION, WITHOUT LONG-TERM CURRENT USE OF INSULIN (HCC): ICD-10-CM

## 2023-12-05 ENCOUNTER — APPOINTMENT (OUTPATIENT)
Dept: LAB | Facility: IMAGING CENTER | Age: 53
End: 2023-12-05
Payer: COMMERCIAL

## 2023-12-05 DIAGNOSIS — E55.9 VITAMIN D INSUFFICIENCY: ICD-10-CM

## 2023-12-05 DIAGNOSIS — E11.9 TYPE 2 DIABETES MELLITUS WITHOUT COMPLICATION, WITHOUT LONG-TERM CURRENT USE OF INSULIN (HCC): ICD-10-CM

## 2023-12-05 LAB
ALBUMIN SERPL BCP-MCNC: 4.4 G/DL (ref 3.5–5)
ALP SERPL-CCNC: 107 U/L (ref 34–104)
ALT SERPL W P-5'-P-CCNC: 18 U/L (ref 7–52)
ANION GAP SERPL CALCULATED.3IONS-SCNC: 8 MMOL/L
AST SERPL W P-5'-P-CCNC: 21 U/L (ref 13–39)
BILIRUB SERPL-MCNC: 0.47 MG/DL (ref 0.2–1)
BUN SERPL-MCNC: 22 MG/DL (ref 5–25)
CALCIUM SERPL-MCNC: 8.5 MG/DL (ref 8.4–10.2)
CHLORIDE SERPL-SCNC: 102 MMOL/L (ref 96–108)
CHOLEST SERPL-MCNC: 94 MG/DL
CO2 SERPL-SCNC: 29 MMOL/L (ref 21–32)
CREAT SERPL-MCNC: 0.82 MG/DL (ref 0.6–1.3)
EST. AVERAGE GLUCOSE BLD GHB EST-MCNC: 148 MG/DL
GFR SERPL CREATININE-BSD FRML MDRD: 81 ML/MIN/1.73SQ M
GLUCOSE P FAST SERPL-MCNC: 123 MG/DL (ref 65–99)
HBA1C MFR BLD: 6.8 %
HDLC SERPL-MCNC: 40 MG/DL
LDLC SERPL CALC-MCNC: 34 MG/DL (ref 0–100)
POTASSIUM SERPL-SCNC: 4.8 MMOL/L (ref 3.5–5.3)
PROT SERPL-MCNC: 8 G/DL (ref 6.4–8.4)
SODIUM SERPL-SCNC: 139 MMOL/L (ref 135–147)
TRIGL SERPL-MCNC: 98 MG/DL
TSH SERPL DL<=0.05 MIU/L-ACNC: 1.19 UIU/ML (ref 0.45–4.5)

## 2023-12-05 PROCEDURE — 83036 HEMOGLOBIN GLYCOSYLATED A1C: CPT

## 2023-12-05 PROCEDURE — 84443 ASSAY THYROID STIM HORMONE: CPT

## 2023-12-05 PROCEDURE — 36415 COLL VENOUS BLD VENIPUNCTURE: CPT

## 2023-12-05 PROCEDURE — 80053 COMPREHEN METABOLIC PANEL: CPT

## 2023-12-05 PROCEDURE — 80061 LIPID PANEL: CPT

## 2023-12-05 RX ORDER — ERGOCALCIFEROL 1.25 MG/1
1 CAPSULE ORAL WEEKLY
Qty: 12 CAPSULE | Refills: 1 | Status: SHIPPED | OUTPATIENT
Start: 2023-12-05

## 2023-12-11 ENCOUNTER — APPOINTMENT (OUTPATIENT)
Dept: LAB | Facility: IMAGING CENTER | Age: 53
End: 2023-12-11
Payer: COMMERCIAL

## 2023-12-11 ENCOUNTER — OFFICE VISIT (OUTPATIENT)
Dept: FAMILY MEDICINE CLINIC | Facility: CLINIC | Age: 53
End: 2023-12-11
Payer: COMMERCIAL

## 2023-12-11 VITALS
TEMPERATURE: 95.9 F | RESPIRATION RATE: 16 BRPM | OXYGEN SATURATION: 99 % | DIASTOLIC BLOOD PRESSURE: 74 MMHG | BODY MASS INDEX: 32 KG/M2 | SYSTOLIC BLOOD PRESSURE: 118 MMHG | HEART RATE: 95 BPM | WEIGHT: 163 LBS | HEIGHT: 60 IN

## 2023-12-11 DIAGNOSIS — R35.0 URINARY FREQUENCY: ICD-10-CM

## 2023-12-11 DIAGNOSIS — I10 ESSENTIAL HYPERTENSION: ICD-10-CM

## 2023-12-11 DIAGNOSIS — H61.23 BILATERAL IMPACTED CERUMEN: ICD-10-CM

## 2023-12-11 DIAGNOSIS — E11.69 TYPE 2 DIABETES MELLITUS WITH OTHER SPECIFIED COMPLICATION, WITHOUT LONG-TERM CURRENT USE OF INSULIN (HCC): Primary | ICD-10-CM

## 2023-12-11 DIAGNOSIS — E78.49 OTHER HYPERLIPIDEMIA: ICD-10-CM

## 2023-12-11 DIAGNOSIS — E03.8 OTHER SPECIFIED HYPOTHYROIDISM: ICD-10-CM

## 2023-12-11 DIAGNOSIS — Z00.00 MEDICARE ANNUAL WELLNESS VISIT, SUBSEQUENT: ICD-10-CM

## 2023-12-11 DIAGNOSIS — H91.93 DECREASED HEARING OF BOTH EARS: ICD-10-CM

## 2023-12-11 LAB

## 2023-12-11 PROCEDURE — 99214 OFFICE O/P EST MOD 30 MIN: CPT | Performed by: FAMILY MEDICINE

## 2023-12-11 PROCEDURE — G0439 PPPS, SUBSEQ VISIT: HCPCS | Performed by: FAMILY MEDICINE

## 2023-12-11 PROCEDURE — 87086 URINE CULTURE/COLONY COUNT: CPT | Performed by: FAMILY MEDICINE

## 2023-12-11 PROCEDURE — 81001 URINALYSIS AUTO W/SCOPE: CPT | Performed by: FAMILY MEDICINE

## 2023-12-11 NOTE — ASSESSMENT & PLAN NOTE
/74. Well controlled on lisinopril 10 mg daily. Continue medication as prescribed. Will continue to monitor.

## 2023-12-11 NOTE — ASSESSMENT & PLAN NOTE
TSH 1.194. Well controlled on levothyroxine 88 mcg daily. Continue medication as prescribed. Will continue to monitor.

## 2023-12-11 NOTE — PROGRESS NOTES
Assessment and Plan:     Problem List Items Addressed This Visit     Diabetes mellitus ([address]) - Primary     Improved. Continue taking metformin and Ozempic as prescribed. I am going to increase Ozempic to 1 mg weekly. Discussed low carbohydrate diet and regular exercise. Will continue to monitor. Lab Results   Component Value Date    HGBA1C 6.8 (H) 12/05/2023            Relevant Medications    semaglutide, 1 mg/dose, (Ozempic) 4 mg/3 mL injection pen    Essential hypertension     /74. Well controlled on lisinopril 10 mg daily. Continue medication as prescribed. Will continue to monitor. Hyperlipidemia     HDL 40. Well controlled on simvastatin 20 mg daily. Continue as prescribed. Discussed low fat diet and regular exercise. Will continue to monitor. Hypothyroidism     TSH 1.194. Well controlled on levothyroxine 88 mcg daily. Continue medication as prescribed. Will continue to monitor. Decreased hearing of both ears     Referral to see ENT. Relevant Orders    Ambulatory Referral to Otolaryngology    Bilateral impacted cerumen     Referral to see ENT. Relevant Orders    Ambulatory Referral to Otolaryngology    Urinary frequency     I am going to check UA and C&S. Relevant Orders    UA w Reflex to Microscopic w Reflex to Culture    Medicare annual wellness visit, subsequent     It was discussed about immunizations, diet, exercise and safety measures. Preventive health issues were discussed with patient, and age appropriate screening tests were ordered as noted in patient's After Visit Summary. Personalized health advice and appropriate referrals for health education or preventive services given if needed, as noted in patient's After Visit Summary.      History of Present Illness:     Patient presents for a Medicare Wellness Visit    Diabetes       Patient Care Team:  Mireya Franklin MD as PCP - General (Family Medicine)  Bayron Erickson MD as PCP - PCP-[employer] ([facility])     Review of Systems:     Review of Systems     Problem List:     Patient Active Problem List   Diagnosis   • Depression   • Diabetes mellitus ([address])   • Essential hypertension   • Hyperlipidemia   • Hypothyroidism   • Mental disability   • Obesity   • Healthcare maintenance   • Anxiety   • Immunization due   • Dermatitis   • Kyphosis   • Vitamin D insufficiency   • Poor hygiene   • Absence of bladder continence   • Decreased hearing of both ears   • Bilateral impacted cerumen   • Urinary frequency   • Medicare annual wellness visit, subsequent      Past Medical and Surgical History:     Past Medical History:   Diagnosis Date   • Diabetes mellitus ([address])    • Disease of thyroid gland    • Hyperlipidemia    • Hypertension    • Intellectual delay    • Seizures ([address])     h/o, no longer on meds   • Tooth decay      Past Surgical History:   Procedure Laterality Date   • NO PAST SURGERIES        Family History:     Family History   Problem Relation Age of Onset   • Dementia Mother    • Stroke Mother    • Colon cancer Father    • Mental illness Brother    • No Known Problems Brother    • Ovarian cancer Cousin    • Breast cancer Neg Hx    • Uterine cancer Neg Hx       Social History:     Social History     Socioeconomic History   • Marital status: Single     Spouse name: None   • Number of children: None   • Years of education: None   • Highest education level: None   Occupational History   • None   Tobacco Use   • Smoking status: Never   • Smokeless tobacco: Never   • Tobacco comments:     No passive smoke exposure   Vaping Use   • Vaping Use: Never used   Substance and Sexual Activity   • Alcohol use: Never   • Drug use: No   • Sexual activity: Never   Other Topics Concern   • None   Social History Narrative   • None     Social Determinants of Health     Financial Resource Strain: Not on file   Food Insecurity: Not on file   Transportation Needs: Not on file   Physical Activity: Not on file Stress: Not on file   Social Connections: Not on file   Intimate Partner Violence: Not on file   Housing Stability: Not on file      Medications and Allergies:     Current Outpatient Medications   Medication Sig Dispense Refill   • Calcium Citrate-Vitamin D 250 mg-2.5 mcg tablet Take 1 tablet by mouth 2 (two) times a day 60 tablet 5   • clotrimazole-betamethasone (LOTRISONE) 1-0.05 % cream Apply topically 2 (two) times a day as needed (rash) 45 g 2   • ergocalciferol (ERGOCALCIFEROL) 1.25 MG (83385 UT) capsule Take 1 capsule (50,000 Units total) by mouth once a week 12 capsule 1   • ergocalciferol (VITAMIN D2) 50,000 units Take 1 capsule (50,000 Units total) by mouth once a week 12 capsule 1   • levothyroxine 88 mcg tablet Take 1 tablet (88 mcg total) by mouth daily 90 tablet 0   • lisinopril (ZESTRIL) 10 mg tablet Take 1 tablet (10 mg total) by mouth daily 100 tablet 1   • metFORMIN (GLUCOPHAGE) 1000 MG tablet Take 1 tablet (1,000 mg total) by mouth 2 (two) times a day with meals At 8 am and 8 pm 60 tablet 1   • PARoxetine (PAXIL) 20 mg tablet Take 1 tablet (20 mg total) by mouth daily 90 tablet 0   • semaglutide, 1 mg/dose, (Ozempic) 4 mg/3 mL injection pen Inject 0.75 mL (1 mg total) under the skin once a week 9 mL 1   • simvastatin (ZOCOR) 20 mg tablet Take 1 tablet (20 mg total) by mouth daily at bedtime 90 tablet 1   • econazole nitrate 1 % cream Every 12 hours (Patient not taking: Reported on 11/22/2022)       No current facility-administered medications for this visit.      Allergies   Allergen Reactions   • No Known Allergies       Immunizations:     Immunization History   Administered Date(s) Administered   • COVID-19 Moderna Vac BIVALENT 12 Yr+ IM 0.5 ML 01/27/2023, 05/05/2023   • COVID-19 PFIZER VACCINE 0.3 ML IM 11/11/2021, 12/09/2021   • COVID-19 Pfizer mRNA vacc PF mira-sucrose 12 yr and older (Giorgio Quant) 10/06/2023   • INFLUENZA 08/08/2014, 08/27/2015, 11/01/2016, 09/12/2020, 10/06/2023   • Influenza Split 02/01/2013   • Influenza, injectable, quadrivalent, preservative free 0.5 mL 09/12/2020   • Influenza, recombinant, quadrivalent,injectable, preservative free 11/22/2022   • Influenza, seasonal, injectable 08/27/2015   • Influenza, seasonal, injectable, preservative free 09/16/2013   • Pneumococcal Polysaccharide PPV23 02/01/2013   • Tdap 11/01/2016   • Zoster Vaccine Recombinant 05/05/2023, 10/06/2023      Health Maintenance:         Topic Date Due   • HIV Screening  Never done   • Colorectal Cancer Screening  Never done   • Breast Cancer Screening: Mammogram  03/16/2024   • Cervical Cancer Screening  03/16/2026   • Hepatitis C Screening  Completed         Topic Date Due   • Pneumococcal Vaccine: Pediatrics (0 to 5 Years) and At-Risk Patients (6 to 59 Years) (2 - PCV) 02/01/2014      Medicare Screening Tests and Risk Assessments:         PREVENTIVE SCREENINGS      Cardiovascular Screening:    General: Screening Not Indicated and History Lipid Disorder      Diabetes Screening:     General: Screening Not Indicated and History Diabetes      Colorectal Cancer Screening:     General: Screening Current      Breast Cancer Screening:     General: Screening Current      Cervical Cancer Screening:    General: Screening Current      Lung Cancer Screening:     General: Screening Not Indicated      Hepatitis C Screening:    General: Screening Current    No results found.      Physical Exam:     /74 (BP Location: Left arm, Patient Position: Sitting, Cuff Size: Large)   Pulse 95   Temp (!) 95.9 °F (35.5 °C) (Tympanic)   Resp 16   Ht 5' (1.524 m)   Wt 73.9 kg (163 lb)   SpO2 99%   BMI 31.83 kg/m²     Physical Exam     Kya Villalta MD

## 2023-12-11 NOTE — ASSESSMENT & PLAN NOTE
Improved. Continue taking metformin and Ozempic as prescribed. I am going to increase Ozempic to 1 mg weekly. Discussed low carbohydrate diet and regular exercise. Will continue to monitor.   Lab Results   Component Value Date    HGBA1C 6.8 (H) 12/05/2023

## 2023-12-11 NOTE — PROGRESS NOTES
Assessment and Plan:     Problem List Items Addressed This Visit     Diabetes mellitus ([address]) - Primary     Improved. Continue taking metformin and Ozempic as prescribed. I am going to increase Ozempic to 1 mg weekly. Discussed low carbohydrate diet and regular exercise. Will continue to monitor. Lab Results   Component Value Date    HGBA1C 6.8 (H) 12/05/2023            Relevant Medications    semaglutide, 1 mg/dose, (Ozempic) 4 mg/3 mL injection pen    Essential hypertension     /74. Well controlled on lisinopril 10 mg daily. Continue medication as prescribed. Will continue to monitor. Hyperlipidemia     HDL 40. Well controlled on simvastatin 20 mg daily. Continue as prescribed. Discussed low fat diet and regular exercise. Will continue to monitor. Hypothyroidism     TSH 1.194. Well controlled on levothyroxine 88 mcg daily. Continue medication as prescribed. Will continue to monitor. Decreased hearing of both ears     Referral to see ENT. Relevant Orders    Ambulatory Referral to Otolaryngology    Bilateral impacted cerumen     Referral to see ENT. Relevant Orders    Ambulatory Referral to Otolaryngology    Urinary frequency     I am going to check UA and C&S. Relevant Orders    UA w Reflex to Microscopic w Reflex to Culture    Medicare annual wellness visit, subsequent     It was discussed about immunizations, diet, exercise and safety measures. Preventive health issues were discussed with patient, and age appropriate screening tests were ordered as noted in patient's After Visit Summary. Personalized health advice and appropriate referrals for health education or preventive services given if needed, as noted in patient's After Visit Summary.      History of Present Illness:     Patient presents for a Medicare Wellness Visit    Diabetes       Patient Care Team:  Ashley Parada MD as PCP - General (Family Medicine)  Theron Du MD as PCP - PCP-[employer] ([facility])     Review of Systems:     Review of Systems     Problem List:     Patient Active Problem List   Diagnosis   • Depression   • Diabetes mellitus ([address])   • Essential hypertension   • Hyperlipidemia   • Hypothyroidism   • Mental disability   • Obesity   • Healthcare maintenance   • Anxiety   • Immunization due   • Dermatitis   • Kyphosis   • Vitamin D insufficiency   • Poor hygiene   • Absence of bladder continence   • Decreased hearing of both ears   • Bilateral impacted cerumen   • Urinary frequency   • Medicare annual wellness visit, subsequent      Past Medical and Surgical History:     Past Medical History:   Diagnosis Date   • Diabetes mellitus ([address])    • Disease of thyroid gland    • Hyperlipidemia    • Hypertension    • Intellectual delay    • Seizures ([address])     h/o, no longer on meds   • Tooth decay      Past Surgical History:   Procedure Laterality Date   • NO PAST SURGERIES        Family History:     Family History   Problem Relation Age of Onset   • Dementia Mother    • Stroke Mother    • Colon cancer Father    • Mental illness Brother    • No Known Problems Brother    • Ovarian cancer Cousin    • Breast cancer Neg Hx    • Uterine cancer Neg Hx       Social History:     Social History     Socioeconomic History   • Marital status: Single     Spouse name: None   • Number of children: None   • Years of education: None   • Highest education level: None   Occupational History   • None   Tobacco Use   • Smoking status: Never   • Smokeless tobacco: Never   • Tobacco comments:     No passive smoke exposure   Vaping Use   • Vaping Use: Never used   Substance and Sexual Activity   • Alcohol use: Never   • Drug use: No   • Sexual activity: Never   Other Topics Concern   • None   Social History Narrative   • None     Social Determinants of Health     Financial Resource Strain: Not on file   Food Insecurity: Not on file   Transportation Needs: Not on file   Physical Activity: Not on file Stress: Not on file   Social Connections: Not on file   Intimate Partner Violence: Not on file   Housing Stability: Not on file      Medications and Allergies:     Current Outpatient Medications   Medication Sig Dispense Refill   • Calcium Citrate-Vitamin D 250 mg-2.5 mcg tablet Take 1 tablet by mouth 2 (two) times a day 60 tablet 5   • clotrimazole-betamethasone (LOTRISONE) 1-0.05 % cream Apply topically 2 (two) times a day as needed (rash) 45 g 2   • ergocalciferol (ERGOCALCIFEROL) 1.25 MG (45581 UT) capsule Take 1 capsule (50,000 Units total) by mouth once a week 12 capsule 1   • ergocalciferol (VITAMIN D2) 50,000 units Take 1 capsule (50,000 Units total) by mouth once a week 12 capsule 1   • levothyroxine 88 mcg tablet Take 1 tablet (88 mcg total) by mouth daily 90 tablet 0   • lisinopril (ZESTRIL) 10 mg tablet Take 1 tablet (10 mg total) by mouth daily 100 tablet 1   • metFORMIN (GLUCOPHAGE) 1000 MG tablet Take 1 tablet (1,000 mg total) by mouth 2 (two) times a day with meals At 8 am and 8 pm 60 tablet 1   • PARoxetine (PAXIL) 20 mg tablet Take 1 tablet (20 mg total) by mouth daily 90 tablet 0   • semaglutide, 1 mg/dose, (Ozempic) 4 mg/3 mL injection pen Inject 0.75 mL (1 mg total) under the skin once a week 9 mL 1   • simvastatin (ZOCOR) 20 mg tablet Take 1 tablet (20 mg total) by mouth daily at bedtime 90 tablet 1   • econazole nitrate 1 % cream Every 12 hours (Patient not taking: Reported on 11/22/2022)       No current facility-administered medications for this visit.      Allergies   Allergen Reactions   • No Known Allergies       Immunizations:     Immunization History   Administered Date(s) Administered   • COVID-19 Moderna Vac BIVALENT 12 Yr+ IM 0.5 ML 01/27/2023, 05/05/2023   • COVID-19 PFIZER VACCINE 0.3 ML IM 11/11/2021, 12/09/2021   • COVID-19 Pfizer mRNA vacc PF mira-sucrose 12 yr and older (Sofie Lima) 10/06/2023   • INFLUENZA 08/08/2014, 08/27/2015, 11/01/2016, 09/12/2020, 10/06/2023   • Influenza Split 02/01/2013   • Influenza, injectable, quadrivalent, preservative free 0.5 mL 09/12/2020   • Influenza, recombinant, quadrivalent,injectable, preservative free 11/22/2022   • Influenza, seasonal, injectable 08/27/2015   • Influenza, seasonal, injectable, preservative free 09/16/2013   • Pneumococcal Polysaccharide PPV23 02/01/2013   • Tdap 11/01/2016   • Zoster Vaccine Recombinant 05/05/2023, 10/06/2023      Health Maintenance:         Topic Date Due   • HIV Screening  Never done   • Colorectal Cancer Screening  Never done   • Breast Cancer Screening: Mammogram  03/16/2024   • Cervical Cancer Screening  03/16/2026   • Hepatitis C Screening  Completed         Topic Date Due   • Pneumococcal Vaccine: Pediatrics (0 to 5 Years) and At-Risk Patients (6 to 59 Years) (2 - PCV) 02/01/2014      Medicare Screening Tests and Risk Assessments:     Tiffany Osborne is here for her Subsequent Wellness visit. Health Risk Assessment:   Patient rates overall health as good. Patient feels that their physical health rating is same. Patient is satisfied with their life. Eyesight was rated as same. Hearing was rated as same. Patient feels that their emotional and mental health rating is same. Patients states they are never, rarely angry. Patient states they are never, rarely unusually tired/fatigued. Pain experienced in the last 7 days has been none. Patient states that she has experienced no weight loss or gain in last 6 months. Fall Risk Screening: In the past year, patient has experienced: no history of falling in past year      Urinary Incontinence Screening:   Patient has leaked urine accidently in the last six months. Home Safety:  Patient does not have trouble with stairs inside or outside of their home. Patient has working smoke alarms and has working carbon monoxide detector. Home safety hazards include: none. Nutrition:   Current diet is Diabetic.      Medications:   Patient is currently taking over-the-counter supplements. OTC medications include: see medication list. Patient is not able to manage medications. Activities of Daily Living (ADLs)/Instrumental Activities of Daily Living (IADLs):   Walk and transfer into and out of bed and chair?: Yes  Dress and groom yourself?: Yes    Bathe or shower yourself?: Yes    Feed yourself? Yes  Do your laundry/housekeeping?: No  Manage your money, pay your bills and track your expenses?: No  Make your own meals?: Yes    Do your own shopping?: No    Previous Hospitalizations:   Any hospitalizations or ED visits within the last 12 months?: No      Advance Care Planning:   Living will: No    Durable POA for healthcare: No    Advanced directive: No      Cognitive Screening:   Provider or family/friend/caregiver concerned regarding cognition?: No    PREVENTIVE SCREENINGS      Cardiovascular Screening:    General: Screening Not Indicated and History Lipid Disorder      Diabetes Screening:     General: Screening Not Indicated and History Diabetes      Colorectal Cancer Screening:     General: Screening Current      Breast Cancer Screening:     General: Screening Current      Cervical Cancer Screening:    General: Screening Current      Osteoporosis Screening:    General: Risks and Benefits Discussed      Abdominal Aortic Aneurysm (AAA) Screening:        General: Screening Not Indicated      Lung Cancer Screening:     General: Screening Not Indicated      Hepatitis C Screening:    General: Screening Current    Screening, Brief Intervention, and Referral to Treatment (SBIRT)    Screening  Typical number of drinks in a day: 0  Typical number of drinks in a week: 0  Interpretation: Low risk drinking behavior. Brief Intervention  Alcohol & drug use screenings were reviewed. No concerns regarding substance use disorder identified. Other Counseling Topics:   Calcium and vitamin D intake and regular weightbearing exercise. No results found.      Physical Exam:     BP 118/74 (BP Location: Left arm, Patient Position: Sitting, Cuff Size: Large)   Pulse 95   Temp (!) 95.9 °F (35.5 °C) (Tympanic)   Resp 16   Ht 5' (1.524 m)   Wt 73.9 kg (163 lb)   SpO2 99%   BMI 31.83 kg/m²     Physical Exam     Marleny Berry MD

## 2023-12-11 NOTE — ASSESSMENT & PLAN NOTE
HDL 40. Well controlled on simvastatin 20 mg daily. Continue as prescribed. Discussed low fat diet and regular exercise. Will continue to monitor.

## 2023-12-11 NOTE — PROGRESS NOTES
Name: Aj Dickson      : 1970      MRN: [id number]  Encounter Provider: Shabbir Montes MD  Encounter Date: 2023   Encounter department: [facility]     1. Type 2 diabetes mellitus with other specified complication, without long-term current use of insulin ([address])  Assessment & Plan:  Improved. Continue taking metformin and Ozempic as prescribed. I am going to increase Ozempic to 1 mg weekly. Discussed low carbohydrate diet and regular exercise. Will continue to monitor. Lab Results   Component Value Date    HGBA1C 6.8 (H) 2023       Orders:  -     semaglutide, 1 mg/dose, (Ozempic) 4 mg/3 mL injection pen; Inject 0.75 mL (1 mg total) under the skin once a week    2. Other specified hypothyroidism  Assessment & Plan:  TSH 1.194. Well controlled on levothyroxine 88 mcg daily. Continue medication as prescribed. Will continue to monitor. 3. Essential hypertension  Assessment & Plan:  /74. Well controlled on lisinopril 10 mg daily. Continue medication as prescribed. Will continue to monitor. 4. Other hyperlipidemia  Assessment & Plan:  HDL 40. Well controlled on simvastatin 20 mg daily. Continue as prescribed. Discussed low fat diet and regular exercise. Will continue to monitor. 5. Decreased hearing of both ears  Assessment & Plan:  Referral to see ENT. Orders:  -     Ambulatory Referral to Otolaryngology; Future    6. Bilateral impacted cerumen  Assessment & Plan:  Referral to see ENT. Orders:  -     Ambulatory Referral to Otolaryngology; Future    7. Urinary frequency  Assessment & Plan:  I am going to check UA and C&S. Orders:  -     UA w Reflex to Microscopic w Reflex to Culture    8. Medicare annual wellness visit, subsequent  Assessment & Plan: It was discussed about immunizations, diet, exercise and safety measures.              Subjective      TB is a 48 yo female with a PMH of Type 2 DM, HTN, HLD, hypothyroidism, presenting to the office for a 2 mo follow up visit for diabetes. Blood work on 12/5/23 shows A1c 6.8, fasting glucose 123, and HDL 40. Blood work was reviewed with patient who expresses understanding. Patient reports not maintaining a low carbohydrate, low fat diet and regular exercise. Patient reports taking medications as prescribed without any complaints. Patient is UTD on immunizations. Wants to make a note that today is just a follow up visit, not a physical. Wants earwax taken out bilaterally. Diabetes  Pertinent negatives for hypoglycemia include no dizziness or headaches. Pertinent negatives for diabetes include no chest pain, no fatigue, no polydipsia, no polyuria and no weakness. Review of Systems   Constitutional:  Negative for chills, diaphoresis, fatigue and fever. HENT:  Negative for congestion and rhinorrhea. Respiratory:  Negative for cough and shortness of breath. Cardiovascular:  Negative for chest pain, palpitations and leg swelling. Gastrointestinal:  Negative for abdominal pain, constipation, diarrhea, nausea and vomiting. Endocrine: Negative for polydipsia and polyuria. Genitourinary:  Negative for difficulty urinating and dysuria. Musculoskeletal:  Negative for arthralgias and myalgias. Neurological:  Negative for dizziness, weakness, light-headedness, numbness and headaches.        Current Outpatient Medications on File Prior to Visit   Medication Sig   • Calcium Citrate-Vitamin D 250 mg-2.5 mcg tablet Take 1 tablet by mouth 2 (two) times a day   • clotrimazole-betamethasone (LOTRISONE) 1-0.05 % cream Apply topically 2 (two) times a day as needed (rash)   • ergocalciferol (ERGOCALCIFEROL) 1.25 MG (05589 UT) capsule Take 1 capsule (50,000 Units total) by mouth once a week   • ergocalciferol (VITAMIN D2) 50,000 units Take 1 capsule (50,000 Units total) by mouth once a week   • levothyroxine 88 mcg tablet Take 1 tablet (88 mcg total) by mouth daily   • lisinopril (ZESTRIL) 10 mg tablet Take 1 tablet (10 mg total) by mouth daily   • metFORMIN (GLUCOPHAGE) 1000 MG tablet Take 1 tablet (1,000 mg total) by mouth 2 (two) times a day with meals At 8 am and 8 pm   • PARoxetine (PAXIL) 20 mg tablet Take 1 tablet (20 mg total) by mouth daily   • simvastatin (ZOCOR) 20 mg tablet Take 1 tablet (20 mg total) by mouth daily at bedtime   • [DISCONTINUED] semaglutide, 0.25 or 0.5 mg/dose, (Ozempic, 0.25 or 0.5 MG/DOSE,) 2 mg/3 mL injection pen 0.25 mg under the skin every 7 days for 2 doses , THEN 0.5 mg under the skin every 7 days   • [DISCONTINUED] tirzepatide 2.5 MG/0.5ML Inject 0.5 mL (2.5 mg total) under the skin every 7 days   • econazole nitrate 1 % cream Every 12 hours (Patient not taking: Reported on 11/22/2022)       Objective     /74 (BP Location: Left arm, Patient Position: Sitting, Cuff Size: Large)   Pulse 95   Temp (!) 95.9 °F (35.5 °C) (Tympanic)   Resp 16   Ht 5' (1.524 m)   Wt 73.9 kg (163 lb)   SpO2 99%   BMI 31.83 kg/m²     Physical Exam  Vitals and nursing note reviewed. Constitutional:       Appearance: Normal appearance. HENT:      Head: Normocephalic and atraumatic. Right Ear: There is impacted cerumen. Left Ear: There is impacted cerumen. Mouth/Throat:      Mouth: Mucous membranes are moist.   Eyes:      Pupils: Pupils are equal, round, and reactive to light. Cardiovascular:      Rate and Rhythm: Normal rate and regular rhythm. Pulses: Normal pulses. Heart sounds: Normal heart sounds. Pulmonary:      Effort: Pulmonary effort is normal.      Breath sounds: Normal breath sounds. Abdominal:      Palpations: Abdomen is soft. Skin:     General: Skin is warm. Capillary Refill: Capillary refill takes less than 2 seconds. Neurological:      Mental Status: She is alert and oriented to person, place, and time.    Psychiatric:         Mood and Affect: Mood normal.         Behavior: Behavior normal. Lauren Stacy MD

## 2023-12-13 LAB — BACTERIA UR CULT: NORMAL

## 2023-12-20 DIAGNOSIS — E03.8 OTHER SPECIFIED HYPOTHYROIDISM: ICD-10-CM

## 2023-12-20 DIAGNOSIS — F41.9 ANXIETY: ICD-10-CM

## 2023-12-20 RX ORDER — PAROXETINE HYDROCHLORIDE 20 MG/1
20 TABLET, FILM COATED ORAL DAILY
Qty: 90 TABLET | Refills: 0 | Status: SHIPPED | OUTPATIENT
Start: 2023-12-20

## 2023-12-20 RX ORDER — LEVOTHYROXINE SODIUM 88 UG/1
88 TABLET ORAL DAILY
Qty: 90 TABLET | Refills: 0 | Status: SHIPPED | OUTPATIENT
Start: 2023-12-20

## 2024-01-10 DIAGNOSIS — E11.9 TYPE 2 DIABETES MELLITUS WITHOUT COMPLICATION, WITHOUT LONG-TERM CURRENT USE OF INSULIN (HCC): ICD-10-CM

## 2024-01-30 ENCOUNTER — OFFICE VISIT (OUTPATIENT)
Dept: FAMILY MEDICINE CLINIC | Facility: CLINIC | Age: 54
End: 2024-01-30
Payer: COMMERCIAL

## 2024-01-30 VITALS
HEART RATE: 104 BPM | OXYGEN SATURATION: 99 % | RESPIRATION RATE: 16 BRPM | BODY MASS INDEX: 31.02 KG/M2 | HEIGHT: 60 IN | WEIGHT: 158 LBS | TEMPERATURE: 97.6 F | DIASTOLIC BLOOD PRESSURE: 84 MMHG | SYSTOLIC BLOOD PRESSURE: 126 MMHG

## 2024-01-30 DIAGNOSIS — R35.0 URINARY FREQUENCY: ICD-10-CM

## 2024-01-30 DIAGNOSIS — I10 ESSENTIAL HYPERTENSION: ICD-10-CM

## 2024-01-30 DIAGNOSIS — E78.49 OTHER HYPERLIPIDEMIA: ICD-10-CM

## 2024-01-30 DIAGNOSIS — H61.23 BILATERAL IMPACTED CERUMEN: ICD-10-CM

## 2024-01-30 DIAGNOSIS — E11.69 TYPE 2 DIABETES MELLITUS WITH OTHER SPECIFIED COMPLICATION, WITHOUT LONG-TERM CURRENT USE OF INSULIN (HCC): Primary | ICD-10-CM

## 2024-01-30 DIAGNOSIS — E03.8 OTHER SPECIFIED HYPOTHYROIDISM: ICD-10-CM

## 2024-01-30 DIAGNOSIS — Z11.1 SCREENING-PULMONARY TB: ICD-10-CM

## 2024-01-30 PROCEDURE — 3079F DIAST BP 80-89 MM HG: CPT | Performed by: FAMILY MEDICINE

## 2024-01-30 PROCEDURE — 3074F SYST BP LT 130 MM HG: CPT | Performed by: FAMILY MEDICINE

## 2024-01-30 PROCEDURE — 99214 OFFICE O/P EST MOD 30 MIN: CPT | Performed by: FAMILY MEDICINE

## 2024-01-30 NOTE — ASSESSMENT & PLAN NOTE
Well-controlled on lisinopril daily. 126/84 in office today. Continue medication, will continue to monitor.

## 2024-01-30 NOTE — PROGRESS NOTES
Diabetic Foot Exam    Patient's shoes and socks removed.    Right Foot/Ankle   Right Foot Inspection  Skin Exam: skin normal and skin intact. No dry skin, no warmth, no callus, no erythema, no maceration, no abnormal color, no pre-ulcer, no ulcer and no callus.     Toe Exam: ROM and strength within normal limits.     Sensory   Monofilament testing: intact    Vascular  Capillary refills: < 3 seconds  The right DP pulse is 2+.     Left Foot/Ankle  Left Foot Inspection  Skin Exam: skin normal and skin intact. No dry skin, no warmth, no erythema, no maceration, normal color, no pre-ulcer, no ulcer and no callus.     Toe Exam: ROM and strength within normal limits.     Sensory   Monofilament testing: intact    Vascular  Capillary refills: < 3 seconds  The left DP pulse is 2+.     Assign Risk Category  No deformity present  No loss of protective sensation  No weak pulses  Risk: 0

## 2024-01-30 NOTE — PROGRESS NOTES
Name: Emelina Lacy      : 1970      MRN: [id number]  Encounter Provider: Dayron Oquendo MD  Encounter Date: 2024   Encounter department: [facility]    Assessment & Plan     1. Type 2 diabetes mellitus with other specified complication, without long-term current use of insulin (HCC)  Assessment & Plan:  Well-controlled on metformin 1000 mg BID and Ozempic 1 mg. Patient is tolerating Ozempic well and has lost 5 pounds since last visit in December.   Continue therapy and follow-up in 3 months.  Lab Results   Component Value Date    HGBA1C 6.8 (H) 2023     Orders:  -     Albumin / creatinine urine ratio; Future; Expected date: 2024  -     Comprehensive metabolic panel; Future; Expected date: 2024  -     Hemoglobin A1C; Future; Expected date: 2024  -     CBC and differential; Future; Expected date: 2024  -     Lipid Panel with Direct LDL reflex; Future; Expected date: 2024  -     TSH, 3rd generation with Free T4 reflex; Future; Expected date: 2024    2. Other specified hypothyroidism  Assessment & Plan:  Well-controlled on levothyroxine 88 mcg. Will continue to monitor TSH.    Orders:  -     Lipid Panel with Direct LDL reflex; Future; Expected date: 2024  -     TSH, 3rd generation with Free T4 reflex; Future; Expected date: 2024    3. Essential hypertension  Assessment & Plan:  Well-controlled on lisinopril daily. 126/84 in office today. Continue medication, will continue to monitor.      4. Urinary frequency  Assessment & Plan:  Patient is following with urogynecology for treatments. Will continue to monitor.      5. Other hyperlipidemia  Assessment & Plan:  Well-controlled on simvastatin daily. Will continue to monitor lipid panel.    Orders:  -     Lipid Panel with Direct LDL reflex; Future; Expected date: 2024  -     TSH, 3rd generation with Free T4 reflex; Future; Expected date: 2024    6. Bilateral  impacted cerumen  Assessment & Plan:  Patient is seeing ENT today. Will continue to follow.      7. Screening-pulmonary TB  -     Quantiferon TB Gold Plus Assay; Future           Subjective     Emelina is a 53 year old female with a past medical history of hypertension, hyperlipidemia, hypothyroidism, and DM II presenting to the office with her caregiver for an annual wellness check. Today she says is feeling well and has no acute complaints. She states she does not feel sick today. Per her caregiver, she is tolerating 1 mg of Ozempic well with no intolerable side effects. She has lost 5 pounds since her last visit here in December. She is seeing an ENT today for her bilateral impacted cerumen. She is following with urogynecology and receiving treatments for her urinary incontinence/overactive bladder. Her blood pressure is 126/84 today. She has been taking her medications daily without issues. She is up to date on vaccinations, colon cancer and breast cancer screening. She is in need of TB screening today.      Review of Systems   Constitutional:  Negative for chills and fever.   HENT:  Negative for congestion and sore throat.    Eyes:  Negative for pain and visual disturbance.   Respiratory:  Negative for cough and shortness of breath.    Cardiovascular:  Negative for chest pain and palpitations.   Gastrointestinal:  Negative for abdominal pain, nausea and vomiting.   Genitourinary:  Negative for dysuria and hematuria.   Musculoskeletal:  Negative for arthralgias and back pain.   Skin:  Negative for color change and rash.   Neurological:  Negative for dizziness and light-headedness.   All other systems reviewed and are negative.      Past Medical History:   Diagnosis Date   • Diabetes mellitus (HCC)    • Disease of thyroid gland    • Hyperlipidemia    • Hypertension    • Intellectual delay    • Seizures (HCC)     h/o, no longer on meds   • Tooth decay      Past Surgical History:   Procedure Laterality Date   • NO  PAST SURGERIES       Family History   Problem Relation Age of Onset   • Dementia Mother    • Stroke Mother    • Colon cancer Father    • Mental illness Brother    • No Known Problems Brother    • Ovarian cancer Cousin    • Breast cancer Neg Hx    • Uterine cancer Neg Hx      Social History     Socioeconomic History   • Marital status: Single     Spouse name: None   • Number of children: None   • Years of education: None   • Highest education level: None   Occupational History   • None   Tobacco Use   • Smoking status: Never   • Smokeless tobacco: Never   • Tobacco comments:     No passive smoke exposure   Vaping Use   • Vaping status: Never Used   Substance and Sexual Activity   • Alcohol use: Never   • Drug use: No   • Sexual activity: Never   Other Topics Concern   • None   Social History Narrative   • None     Social Determinants of Health     Financial Resource Strain: Not on file   Food Insecurity: Not on file   Transportation Needs: Not on file   Physical Activity: Not on file   Stress: Not on file   Social Connections: Not on file   Intimate Partner Violence: Not on file   Housing Stability: Not on file     Current Outpatient Medications on File Prior to Visit   Medication Sig   • Calcium Citrate-Vitamin D 250 mg-2.5 mcg tablet Take 1 tablet by mouth 2 (two) times a day   • clotrimazole-betamethasone (LOTRISONE) 1-0.05 % cream Apply topically 2 (two) times a day as needed (rash)   • ergocalciferol (ERGOCALCIFEROL) 1.25 MG (52155 UT) capsule Take 1 capsule (50,000 Units total) by mouth once a week   • levothyroxine 88 mcg tablet Take 1 tablet (88 mcg total) by mouth daily   • lisinopril (ZESTRIL) 10 mg tablet Take 1 tablet (10 mg total) by mouth daily   • metFORMIN (GLUCOPHAGE) 1000 MG tablet Take 1 tablet (1,000 mg total) by mouth 2 (two) times a day with meals At 8 am and 8 pm   • PARoxetine (PAXIL) 20 mg tablet Take 1 tablet (20 mg total) by mouth daily   • semaglutide, 1 mg/dose, (Ozempic) 4 mg/3 mL  injection pen Inject 0.75 mL (1 mg total) under the skin once a week   • simvastatin (ZOCOR) 20 mg tablet Take 1 tablet (20 mg total) by mouth daily at bedtime   • econazole nitrate 1 % cream Every 12 hours (Patient not taking: Reported on 11/22/2022)   • [DISCONTINUED] ergocalciferol (VITAMIN D2) 50,000 units Take 1 capsule (50,000 Units total) by mouth once a week (Patient not taking: Reported on 1/30/2024)     Allergies   Allergen Reactions   • No Known Allergies      Immunization History   Administered Date(s) Administered   • COVID-19 Moderna Vac BIVALENT 12 Yr+ IM 0.5 ML 01/27/2023, 05/05/2023   • COVID-19 PFIZER VACCINE 0.3 ML IM 11/11/2021, 12/09/2021   • COVID-19 Pfizer mRNA vacc PF mira-sucrose 12 yr and older (Comirnaty) 10/06/2023   • INFLUENZA 08/08/2014, 08/27/2015, 11/01/2016, 09/12/2020, 10/06/2023   • Influenza Split 02/01/2013   • Influenza, injectable, quadrivalent, preservative free 0.5 mL 09/12/2020   • Influenza, recombinant, quadrivalent,injectable, preservative free 11/22/2022   • Influenza, seasonal, injectable 08/27/2015   • Influenza, seasonal, injectable, preservative free 09/16/2013   • Pneumococcal Polysaccharide PPV23 02/01/2013   • Tdap 11/01/2016   • Zoster Vaccine Recombinant 05/05/2023, 10/06/2023       Objective     /84 (BP Location: Left arm, Patient Position: Sitting, Cuff Size: Adult)   Pulse 104   Temp 97.6 °F (36.4 °C) (Tympanic)   Resp 16   Ht 5' (1.524 m)   Wt 71.7 kg (158 lb)   SpO2 99%   BMI 30.86 kg/m²     Physical Exam  Vitals and nursing note reviewed.   Constitutional:       General: She is not in acute distress.     Appearance: Normal appearance.   HENT:      Head: Normocephalic and atraumatic.      Nose: Nose normal.      Mouth/Throat:      Mouth: Mucous membranes are moist.      Pharynx: Oropharynx is clear. No oropharyngeal exudate or posterior oropharyngeal erythema.   Eyes:      General: No scleral icterus.        Right eye: No discharge.          Left eye: No discharge.      Extraocular Movements: Extraocular movements intact.   Cardiovascular:      Rate and Rhythm: Normal rate and regular rhythm.      Pulses: Normal pulses.           Dorsalis pedis pulses are 2+ on the right side and 2+ on the left side.      Heart sounds: Normal heart sounds. No murmur heard.     No friction rub. No gallop.   Pulmonary:      Effort: Pulmonary effort is normal. No respiratory distress.      Breath sounds: Normal breath sounds. No wheezing, rhonchi or rales.   Musculoskeletal:         General: Normal range of motion.      Cervical back: Normal range of motion.      Right lower leg: No edema.      Left lower leg: No edema.   Feet:      Right foot:      Skin integrity: Skin integrity normal.      Left foot:      Skin integrity: Skin integrity normal.   Skin:     General: Skin is warm and dry.      Capillary Refill: Capillary refill takes less than 2 seconds.      Coloration: Skin is not jaundiced or pale.      Findings: No erythema.   Neurological:      General: No focal deficit present.      Mental Status: She is alert.   Psychiatric:         Mood and Affect: Mood normal.       Dayron Oquendo MD

## 2024-01-30 NOTE — ASSESSMENT & PLAN NOTE
Patient needs TB screening today.  Vaccinations and screenings were reviewed, she is up to date on vaccinations, breast cancer screening and is due for colonoscopy this year which her caregiver will schedule.

## 2024-01-30 NOTE — ASSESSMENT & PLAN NOTE
Well-controlled on metformin 1000 mg BID and Ozempic 1 mg. Patient is tolerating Ozempic well and has lost 5 pounds since last visit in December.   Continue therapy and follow-up in 3 months.  Lab Results   Component Value Date    HGBA1C 6.8 (H) 12/05/2023

## 2024-02-05 ENCOUNTER — VBI (OUTPATIENT)
Dept: ADMINISTRATIVE | Facility: OTHER | Age: 54
End: 2024-02-05

## 2024-02-09 PROBLEM — Z00.00 MEDICARE ANNUAL WELLNESS VISIT, SUBSEQUENT: Status: RESOLVED | Noted: 2023-12-11 | Resolved: 2024-02-09

## 2024-02-09 PROBLEM — H61.23 BILATERAL IMPACTED CERUMEN: Status: RESOLVED | Noted: 2023-12-11 | Resolved: 2024-02-09

## 2024-02-21 PROBLEM — Z11.1 SCREENING-PULMONARY TB: Status: RESOLVED | Noted: 2024-01-30 | Resolved: 2024-02-21

## 2024-02-21 PROBLEM — Z00.00 HEALTHCARE MAINTENANCE: Status: RESOLVED | Noted: 2019-04-26 | Resolved: 2024-02-21

## 2024-02-27 DIAGNOSIS — E03.8 OTHER SPECIFIED HYPOTHYROIDISM: ICD-10-CM

## 2024-02-27 DIAGNOSIS — F41.9 ANXIETY: ICD-10-CM

## 2024-02-27 RX ORDER — LEVOTHYROXINE SODIUM 88 UG/1
88 TABLET ORAL DAILY
Qty: 90 TABLET | Refills: 1 | Status: SHIPPED | OUTPATIENT
Start: 2024-02-27

## 2024-02-27 RX ORDER — PAROXETINE HYDROCHLORIDE 20 MG/1
20 TABLET, FILM COATED ORAL DAILY
Qty: 90 TABLET | Refills: 1 | Status: SHIPPED | OUTPATIENT
Start: 2024-02-27

## 2024-02-29 DIAGNOSIS — E55.9 VITAMIN D INSUFFICIENCY: ICD-10-CM

## 2024-02-29 DIAGNOSIS — E78.49 OTHER HYPERLIPIDEMIA: ICD-10-CM

## 2024-02-29 RX ORDER — SIMVASTATIN 20 MG
20 TABLET ORAL
Qty: 90 TABLET | Refills: 1 | Status: SHIPPED | OUTPATIENT
Start: 2024-02-29

## 2024-02-29 NOTE — TELEPHONE ENCOUNTER
From cc'd charts    Reason for call:   [] Refill   [] Prior Auth  [x] Other: new script to be kept on file    Office:   [x] PCP/Provider - Jairo OCAMPO / [facility]  [] Specialty/Provider -     Medication: Calcium Citrate-Vit D    Dose/Frequency: 250mg-2.5mcg bid    Quantity: 180    Pharmacy: [place] [place] - [address] [place] [address]

## 2024-02-29 NOTE — TELEPHONE ENCOUNTER
Fax in cc'd charts    Reason for call:   [] Refill   [] Prior Auth  [x] Other: new script to be kept on file    Office:   [x] PCP/Provider - Jairo OCAMPO / [facility]  [] Specialty/Provider -     Medication: simvastatin    Dose/Frequency: 20mg qhs    Quantity: 90    Pharmacy: [place] [place] - [address] [place] [address]

## 2024-03-21 ENCOUNTER — HOSPITAL ENCOUNTER (OUTPATIENT)
Dept: RADIOLOGY | Facility: IMAGING CENTER | Age: 54
End: 2024-03-21
Payer: COMMERCIAL

## 2024-03-21 ENCOUNTER — ANNUAL EXAM (OUTPATIENT)
Dept: OBGYN CLINIC | Facility: CLINIC | Age: 54
End: 2024-03-21
Payer: COMMERCIAL

## 2024-03-21 VITALS
DIASTOLIC BLOOD PRESSURE: 74 MMHG | HEIGHT: 60 IN | BODY MASS INDEX: 30.35 KG/M2 | WEIGHT: 154.6 LBS | SYSTOLIC BLOOD PRESSURE: 118 MMHG

## 2024-03-21 VITALS — WEIGHT: 154 LBS | BODY MASS INDEX: 30.23 KG/M2 | HEIGHT: 60 IN

## 2024-03-21 DIAGNOSIS — Z01.419 ENCOUNTER FOR GYNECOLOGICAL EXAMINATION WITHOUT ABNORMAL FINDING: Primary | ICD-10-CM

## 2024-03-21 DIAGNOSIS — Z12.31 ENCOUNTER FOR SCREENING MAMMOGRAM FOR MALIGNANT NEOPLASM OF BREAST: ICD-10-CM

## 2024-03-21 DIAGNOSIS — Z12.31 ENCOUNTER FOR SCREENING MAMMOGRAM FOR BREAST CANCER: ICD-10-CM

## 2024-03-21 DIAGNOSIS — N95.1 PERIMENOPAUSAL: ICD-10-CM

## 2024-03-21 PROCEDURE — 77063 BREAST TOMOSYNTHESIS BI: CPT

## 2024-03-21 PROCEDURE — G0101 CA SCREEN;PELVIC/BREAST EXAM: HCPCS | Performed by: NURSE PRACTITIONER

## 2024-03-21 PROCEDURE — 77067 SCR MAMMO BI INCL CAD: CPT

## 2024-03-21 RX ORDER — AMOXICILLIN 500 MG/1
500 CAPSULE ORAL 3 TIMES DAILY
COMMUNITY
Start: 2024-03-20

## 2024-03-21 RX ORDER — FLUTICASONE PROPIONATE 50 MCG
2 SPRAY, SUSPENSION (ML) NASAL DAILY
COMMUNITY
Start: 2024-02-20

## 2024-03-21 RX ORDER — OFLOXACIN 3 MG/ML
SOLUTION AURICULAR (OTIC)
COMMUNITY
Start: 2024-01-30

## 2024-03-21 NOTE — PATIENT INSTRUCTIONS
Weight Management   WHAT YOU NEED TO KNOW:   Being overweight increases your risk of health conditions such as heart disease, high blood pressure, type 2 diabetes, and certain types of cancer. It can also increase your risk for osteoarthritis, sleep apnea, and other respiratory problems. Aim for a slow, steady weight loss. Even a small amount of weight loss can lower your risk of health problems.  DISCHARGE INSTRUCTIONS:   How to lose weight safely:  A safe and healthy way to lose weight is to eat fewer calories and get regular exercise.  You can lose up about 1 pound a week by decreasing the number of calories you eat by 500 calories each day. You can decrease calories by eating smaller portion sizes or by cutting out high-calorie foods. Read labels to find out how many calories are in the foods you eat.         You can also burn calories with exercise such as walking, swimming, or biking. You will be more likely to keep weight off if you make these changes part of your lifestyle. Exercise at least 30 minutes per day on most days of the week. You can also fit in more physical activity by taking the stairs instead of the elevator or parking farther away from stores. Ask your healthcare provider about the best exercise plan for you.       Healthy meal plan for weight management:  A healthy meal plan includes a variety of foods, contains fewer calories, and helps you stay healthy. A healthy meal plan includes the following:     Eat whole-grain foods more often.  A healthy meal plan should contain fiber. Fiber is the part of grains, fruits, and vegetables that is not broken down by your body. Whole-grain foods are healthy and provide extra fiber in your diet. Some examples of whole-grain foods are whole-wheat breads and pastas, oatmeal, brown rice, and bulgur.    Eat a variety of vegetables every day.  Include dark, leafy greens such as spinach, kale, ernestina greens, and mustard greens. Eat yellow and orange vegetables  such as carrots, sweet potatoes, and winter squash.     Eat a variety of fruits every day.  Choose fresh or canned fruit (canned in its own juice or light syrup) instead of juice. Fruit juice has very little or no fiber.    Eat low-fat dairy foods.  Drink fat-free (skim) milk or 1% milk. Eat fat-free yogurt and low-fat cottage cheese. Try low-fat cheeses such as mozzarella and other reduced-fat cheeses.    Choose meat and other protein foods that are low in fat.  Choose beans or other legumes such as split peas or lentils. Choose fish, skinless poultry (chicken or turkey), or lean cuts of red meat (beef or pork). Before you cook meat or poultry, cut off any visible fat.     Use less fat and oil.  Try baking foods instead of frying them. Add less fat, such as margarine, sour cream, regular salad dressing, and mayonnaise to foods. Eat fewer high-fat foods. Some examples of high-fat foods include french fries, doughnuts, ice cream, and cakes.    Eat fewer sweets.  Limit foods and drinks that are high in sugar. This includes candy, cookies, regular soda, and sweetened drinks.  Ways to decrease calories:   Eat smaller portions.     Use a small plate with smaller servings.    Do not eat second helpings.    When you eat at a restaurant, ask for a box and place half of your meal in the box before you eat.    Share an entrée with someone else.    Replace high-calorie snacks with healthy, low-calorie snacks.     Choose fresh fruit, vegetables, fat-free rice cakes, or air-popped popcorn instead of potato chips, nuts, or chocolate.    Choose water or calorie-free drinks instead of soda or sweetened drinks.    Do not shop for groceries when you are hungry.  You may be more likely to make unhealthy food choices. Take a grocery list of healthy foods and shop after you have eaten.    Eat regular meals. Do not skip meals. Skipping meals can lead to overeating later in the day. This can make it harder for you to lose weight. Eat a  healthy snack in place of a meal if you do not have time to eat a regular meal. Talk with a dietitian to help you create a meal plan and schedule that is right for you.    Other things to consider as you try to lose weight:   Be aware of situations that may give you the urge to overeat, such as eating while watching television. Find ways to avoid these situations. For example, read a book, go for a walk, or do crafts.    Meet with a weight loss support group or friends who are also trying to lose weight. This may help you stay motivated to continue working on your weight loss goals.    © Copyright [employer] 2023 Information is for End User's use only and may not be sold, redistributed or otherwise used for commercial purposes.  The above information is an  only. It is not intended as medical advice for individual conditions or treatments. Talk to your doctor, nurse or pharmacist before following any medical regimen to see if it is safe and effective for you.    Calcium and Osteoporosis   AMBULATORY CARE:   Why calcium is important for osteoporosis:  Calcium is important for osteoporosis because calcium helps build bone mass. Osteoporosis is a long-term medical condition that causes your body to break down more bone than it makes. Your bones become weak, brittle, and more likely to fracture.   Your calcium needs:   Women:      19 to 50 years: 1,000 mg    Over 50: 1,200 mg    Pregnant or breastfeeding, 19 years to 50 years: 1,000 mg    Men:      19 to 70: 1,000 mg    Over 70: 1,200 mg    Foods that are high in calcium:  The following list shows the number of calcium milligrams (mg) per serving. Your dietitian or healthcare provider can help you create a balanced meal plan for your calcium needs.  Dairy:      1 cup of low-fat plain yogurt (415 mg) or low-fat fruit yogurt (245 to 384 mg)    1½ ounces of shredded cheddar cheese (306 mg) or part skim mozzarella cheese (275 mg)    1 cup of skim, 2%, or whole  milk (300 mg)    1 cup of cottage cheese made with 2% milk fat (138 mg)    ½ cup of frozen yogurt (103 mg)    Other foods:      1 cup of calcium-fortified orange juice (300 mg)    ½ cup of cooked ernestina greens (220 mg)    4 canned sardines, with bones (242 mg)    ½ cup of tofu (with added calcium) (204 mg)       How to get extra calcium:   Add powdered milk to puddings, cocoa, custard, or hot cereal.    Sift powdered milk into flour when you make cakes, cookies, or breads.    Use low-fat or fat-free milk instead of water in pancake mix, mashed potatoes, pudding, or hot breakfast cereal.    Add low-fat or fat-free cheese to salad, soup, or pasta.    Add tofu (with added calcium) to vegetable stir-sweet.    Take calcium supplements if you cannot get enough calcium from the foods you eat. Your body can absorb the most calcium from supplements when you take 500 mg or less at one time. Do not take more than 2,500 mg of calcium supplements each day.    Follow up with your doctor or dietitian as directed:  Write down your questions so you remember to ask them during your visits.  © Copyright [employer] 2023 Information is for End User's use only and may not be sold, redistributed or otherwise used for commercial purposes.  The above information is an  only. It is not intended as medical advice for individual conditions or treatments. Talk to your doctor, nurse or pharmacist before following any medical regimen to see if it is safe and effective for you.    EXERCISE RECOMMENDATIONS:  Recommend regular exercise, 30 minutes of cardiovascular, 4-5 times a week (brisk walking, jogging, biking, elliptical).

## 2024-03-21 NOTE — PROGRESS NOTES
"Assessment / Plan    1. Encounter for gynecological examination without abnormal finding  Well woman exam  3/2023 pap/hpv negative.  Repeat .  Has consult for colonoscopy.    2. Encounter for screening mammogram for breast cancer  Has appointment today.    3. Perimenopausal  10/2023 US w/ 3 mm endometrial lining.  E2 19.4, FSH 19.4.  Watch for further bleeding and follow up if occurs.      Subjective      Emelina Lacy is a 53 y.o. female who presents for her annual gynecologic exam.    52 yo G0 PMF.  Presents with caregiver \"Cherri\"with whom patient lives.    Seen by GENEVA Rivera MD 10/2023 for abn bleeding-- estradiol 19.4, FSH 19.4; ultrasound 3 mm EML,  1.2 cm intramural fibroid.  She was told that she is in ely-menopause. Also ordered pelvic floor electrical stimulation for urinary incontinence.  She has done 3 so far.    1st pap done last year, 3/2023, pap and HPV negative.  Pap hx: NL  STD hx: none  3/2023 Mammogram negative; update scheduled 3/21/24  Colon cancer screening: has a consult today; had one last year, but inadequate prep.  Father with h/o colon ca    Sexually active: never  Calcium intake: given guidelines  Exercise: no  Safety:     Periods are irregular; was thought to be post-menopausal, bled last .  Labs reflect perimenopause.  Current contraception: abstinence  History of abnormal Pap smear: no  Family history of breast,uterine, ovarian or colon cancer: yes - father colon ca; cousin ovarian    Menstrual History:  OB History          0    Para   0    Term   0       0    AB   0    Living   0         SAB   0    IAB   0    Ectopic   0    Multiple   0    Live Births   0           Obstetric Comments   Menarche 19   Perimenopausal                 No LMP recorded. Patient is premenopausal.       The following portions of the patient's history were reviewed and updated as appropriate: allergies, current medications, past family history, past medical history, past social history, " past surgical history, and problem list.    Review of Systems      Review of Systems   Constitutional:  Negative for chills and fever.   Respiratory:  Negative for cough and shortness of breath.    Gastrointestinal:  Negative for abdominal distention, abdominal pain, blood in stool, constipation, diarrhea, nausea and vomiting.   Genitourinary:  Negative for difficulty urinating, dysuria, frequency, genital sores, hematuria, menstrual problem, pelvic pain, urgency, vaginal bleeding and vaginal discharge.        Urinary incontinence   Musculoskeletal:  Negative for arthralgias and myalgias.     Breasts:  Negative for skin changes, dimpling, asymmetry, nipple discharge, redness, tenderness or palpable masses      Objective      /74 (BP Location: Right arm, Patient Position: Sitting, Cuff Size: Standard)   Ht 5' (1.524 m)   Wt 70.1 kg (154 lb 9.6 oz)   BMI 30.19 kg/m²   Physical Exam  Constitutional:       General: She is not in acute distress.     Appearance: Normal appearance. She is well-developed. She is not ill-appearing or diaphoretic.      Comments: Bmi 30.2   HENT:      Head: Normocephalic and atraumatic.   Eyes:      Pupils: Pupils are equal, round, and reactive to light.   Neck:      Thyroid: No thyromegaly.   Pulmonary:      Effort: Pulmonary effort is normal.   Chest:   Breasts:     Breasts are symmetrical.      Right: No inverted nipple, mass, nipple discharge, skin change or tenderness.      Left: No inverted nipple, mass, nipple discharge, skin change or tenderness.   Abdominal:      General: There is no distension.      Palpations: Abdomen is soft. There is no mass.      Tenderness: There is no abdominal tenderness. There is no guarding or rebound.   Genitourinary:     General: Normal vulva.      Exam position: Lithotomy position.      Labia:         Right: No rash, tenderness, lesion or injury.         Left: No rash, tenderness, lesion or injury.       Vagina: No signs of injury and foreign  body. No vaginal discharge, erythema, tenderness or bleeding.      Cervix: No cervical motion tenderness, discharge or friability.      Uterus: Not enlarged and not tender.       Adnexa:         Right: No mass or tenderness.          Left: No mass or tenderness.     Musculoskeletal:      Cervical back: Neck supple.   Lymphadenopathy:      Cervical: No cervical adenopathy.      Upper Body:      Right upper body: No supraclavicular adenopathy.      Left upper body: No supraclavicular adenopathy.   Skin:     General: Skin is warm and dry.   Neurological:      General: No focal deficit present.      Mental Status: She is alert and oriented to person, place, and time.   Psychiatric:         Mood and Affect: Mood normal.         Behavior: Behavior normal.         Thought Content: Thought content normal.         Judgment: Judgment normal.

## 2024-03-22 DIAGNOSIS — E11.9 TYPE 2 DIABETES MELLITUS WITHOUT COMPLICATION, WITHOUT LONG-TERM CURRENT USE OF INSULIN (HCC): ICD-10-CM

## 2024-03-22 DIAGNOSIS — E78.49 OTHER HYPERLIPIDEMIA: ICD-10-CM

## 2024-03-22 DIAGNOSIS — I10 ESSENTIAL HYPERTENSION: ICD-10-CM

## 2024-03-22 RX ORDER — LISINOPRIL 10 MG/1
10 TABLET ORAL DAILY
Qty: 100 TABLET | Refills: 1 | Status: SHIPPED | OUTPATIENT
Start: 2024-03-22

## 2024-03-22 RX ORDER — SIMVASTATIN 20 MG
20 TABLET ORAL
Qty: 100 TABLET | Refills: 1 | Status: SHIPPED | OUTPATIENT
Start: 2024-03-22

## 2024-03-22 NOTE — TELEPHONE ENCOUNTER
Received fax [employer] requesting 100day supply of lisinopril, metformin and simvastatin to [facility]. Request sent to provider.

## 2024-04-01 ENCOUNTER — TELEPHONE (OUTPATIENT)
Dept: FAMILY MEDICINE CLINIC | Facility: CLINIC | Age: 54
End: 2024-04-01

## 2024-04-01 NOTE — TELEPHONE ENCOUNTER
----- Message from Dayron Oquendo MD sent at 4/1/2024  6:19 AM EDT -----  Benign mammogram.  Continue routine screening with mammogram in 1 year.

## 2024-04-02 DIAGNOSIS — E11.69 TYPE 2 DIABETES MELLITUS WITH OTHER SPECIFIED COMPLICATION, WITHOUT LONG-TERM CURRENT USE OF INSULIN (HCC): ICD-10-CM

## 2024-04-23 ENCOUNTER — APPOINTMENT (OUTPATIENT)
Dept: LAB | Facility: IMAGING CENTER | Age: 54
End: 2024-04-23
Payer: COMMERCIAL

## 2024-04-23 ENCOUNTER — VBI (OUTPATIENT)
Dept: ADMINISTRATIVE | Facility: OTHER | Age: 54
End: 2024-04-23

## 2024-04-23 DIAGNOSIS — E78.49 OTHER HYPERLIPIDEMIA: ICD-10-CM

## 2024-04-23 DIAGNOSIS — E11.69 TYPE 2 DIABETES MELLITUS WITH OTHER SPECIFIED COMPLICATION, WITHOUT LONG-TERM CURRENT USE OF INSULIN (HCC): ICD-10-CM

## 2024-04-23 DIAGNOSIS — E03.8 OTHER SPECIFIED HYPOTHYROIDISM: ICD-10-CM

## 2024-04-23 DIAGNOSIS — Z11.1 SCREENING-PULMONARY TB: ICD-10-CM

## 2024-04-23 LAB
BASOPHILS # BLD AUTO: 0.03 THOUSANDS/ÂΜL (ref 0–0.1)
BASOPHILS NFR BLD AUTO: 0 % (ref 0–1)
CREAT UR-MCNC: 98.6 MG/DL
EOSINOPHIL # BLD AUTO: 0.21 THOUSAND/ÂΜL (ref 0–0.61)
EOSINOPHIL NFR BLD AUTO: 2 % (ref 0–6)
ERYTHROCYTE [DISTWIDTH] IN BLOOD BY AUTOMATED COUNT: 14.2 % (ref 11.6–15.1)
HCT VFR BLD AUTO: 41 % (ref 34.8–46.1)
HGB BLD-MCNC: 12.9 G/DL (ref 11.5–15.4)
IMM GRANULOCYTES # BLD AUTO: 0.05 THOUSAND/UL (ref 0–0.2)
IMM GRANULOCYTES NFR BLD AUTO: 1 % (ref 0–2)
LYMPHOCYTES # BLD AUTO: 2.71 THOUSANDS/ÂΜL (ref 0.6–4.47)
LYMPHOCYTES NFR BLD AUTO: 29 % (ref 14–44)
MCH RBC QN AUTO: 28.1 PG (ref 26.8–34.3)
MCHC RBC AUTO-ENTMCNC: 31.5 G/DL (ref 31.4–37.4)
MCV RBC AUTO: 89 FL (ref 82–98)
MICROALBUMIN UR-MCNC: 7.2 MG/L
MICROALBUMIN/CREAT 24H UR: 7 MG/G CREATININE (ref 0–30)
MONOCYTES # BLD AUTO: 0.44 THOUSAND/ÂΜL (ref 0.17–1.22)
MONOCYTES NFR BLD AUTO: 5 % (ref 4–12)
NEUTROPHILS # BLD AUTO: 5.86 THOUSANDS/ÂΜL (ref 1.85–7.62)
NEUTS SEG NFR BLD AUTO: 63 % (ref 43–75)
NRBC BLD AUTO-RTO: 0 /100 WBCS
PLATELET # BLD AUTO: 231 THOUSANDS/UL (ref 149–390)
PMV BLD AUTO: 12.3 FL (ref 8.9–12.7)
RBC # BLD AUTO: 4.59 MILLION/UL (ref 3.81–5.12)
T4 FREE SERPL-MCNC: 1.16 NG/DL (ref 0.61–1.12)
TSH SERPL DL<=0.05 MIU/L-ACNC: 0.16 UIU/ML (ref 0.45–4.5)
WBC # BLD AUTO: 9.3 THOUSAND/UL (ref 4.31–10.16)

## 2024-04-23 PROCEDURE — 82043 UR ALBUMIN QUANTITATIVE: CPT

## 2024-04-23 PROCEDURE — 85025 COMPLETE CBC W/AUTO DIFF WBC: CPT

## 2024-04-23 PROCEDURE — 36415 COLL VENOUS BLD VENIPUNCTURE: CPT

## 2024-04-23 PROCEDURE — 86480 TB TEST CELL IMMUN MEASURE: CPT

## 2024-04-23 PROCEDURE — 3061F NEG MICROALBUMINURIA REV: CPT | Performed by: FAMILY MEDICINE

## 2024-04-23 PROCEDURE — 83036 HEMOGLOBIN GLYCOSYLATED A1C: CPT

## 2024-04-23 PROCEDURE — 82570 ASSAY OF URINE CREATININE: CPT

## 2024-04-23 PROCEDURE — 80053 COMPREHEN METABOLIC PANEL: CPT

## 2024-04-23 PROCEDURE — 84443 ASSAY THYROID STIM HORMONE: CPT

## 2024-04-23 PROCEDURE — 80061 LIPID PANEL: CPT

## 2024-04-23 PROCEDURE — 84439 ASSAY OF FREE THYROXINE: CPT

## 2024-04-23 NOTE — LETTER
Provider's name- Dayron Oquendo  Pt name /     ESSENCE ESPARZA 1970    PBG name and PBG number /[facility], [id number]  Provider TIN  [id number]

## 2024-04-24 LAB
ALBUMIN SERPL BCP-MCNC: 4.6 G/DL (ref 3.5–5)
ALP SERPL-CCNC: 99 U/L (ref 34–104)
ALT SERPL W P-5'-P-CCNC: 25 U/L (ref 7–52)
ANION GAP SERPL CALCULATED.3IONS-SCNC: 12 MMOL/L (ref 4–13)
AST SERPL W P-5'-P-CCNC: 25 U/L (ref 13–39)
BILIRUB SERPL-MCNC: 0.41 MG/DL (ref 0.2–1)
BUN SERPL-MCNC: 22 MG/DL (ref 5–25)
CALCIUM SERPL-MCNC: 9 MG/DL (ref 8.4–10.2)
CHLORIDE SERPL-SCNC: 99 MMOL/L (ref 96–108)
CHOLEST SERPL-MCNC: 109 MG/DL
CO2 SERPL-SCNC: 28 MMOL/L (ref 21–32)
CREAT SERPL-MCNC: 0.59 MG/DL (ref 0.6–1.3)
EST. AVERAGE GLUCOSE BLD GHB EST-MCNC: 131 MG/DL
GAMMA INTERFERON BACKGROUND BLD IA-ACNC: 0.01 IU/ML
GFR SERPL CREATININE-BSD FRML MDRD: 104 ML/MIN/1.73SQ M
GLUCOSE P FAST SERPL-MCNC: 90 MG/DL (ref 65–99)
HBA1C MFR BLD: 6.2 %
HDLC SERPL-MCNC: 53 MG/DL
LDLC SERPL CALC-MCNC: 37 MG/DL (ref 0–100)
M TB IFN-G BLD-IMP: NEGATIVE
M TB IFN-G CD4+ BCKGRND COR BLD-ACNC: 0.03 IU/ML
M TB IFN-G CD4+ BCKGRND COR BLD-ACNC: 0.04 IU/ML
MITOGEN IGNF BCKGRD COR BLD-ACNC: 9.99 IU/ML
POTASSIUM SERPL-SCNC: 4.7 MMOL/L (ref 3.5–5.3)
PROT SERPL-MCNC: 7.8 G/DL (ref 6.4–8.4)
SODIUM SERPL-SCNC: 139 MMOL/L (ref 135–147)
TRIGL SERPL-MCNC: 97 MG/DL

## 2024-04-24 PROCEDURE — 3044F HG A1C LEVEL LT 7.0%: CPT | Performed by: FAMILY MEDICINE

## 2024-04-25 DIAGNOSIS — E03.8 OTHER SPECIFIED HYPOTHYROIDISM: ICD-10-CM

## 2024-04-25 RX ORDER — LEVOTHYROXINE SODIUM 0.07 MG/1
75 TABLET ORAL DAILY
Qty: 90 TABLET | Refills: 1 | Status: SHIPPED | OUTPATIENT
Start: 2024-04-25

## 2024-04-26 ENCOUNTER — TELEPHONE (OUTPATIENT)
Dept: FAMILY MEDICINE CLINIC | Facility: CLINIC | Age: 54
End: 2024-04-26

## 2024-04-26 NOTE — TELEPHONE ENCOUNTER
----- Message from Dayron Oquendo MD sent at 4/25/2024  5:25 PM EDT -----  Blood work showed A1c improved to 6.2.  Her TSH came back low and her T4 is high.  I am going to decrease her levothyroxine to 75 mcg daily.  I sent the prescription to the pharmacy.  All the rest of blood work came back stable.  I recommend repeat her TSH and free T4 in 2 months.

## 2024-04-30 ENCOUNTER — OFFICE VISIT (OUTPATIENT)
Dept: FAMILY MEDICINE CLINIC | Facility: CLINIC | Age: 54
End: 2024-04-30
Payer: COMMERCIAL

## 2024-04-30 VITALS
SYSTOLIC BLOOD PRESSURE: 110 MMHG | TEMPERATURE: 96.4 F | HEART RATE: 113 BPM | OXYGEN SATURATION: 95 % | RESPIRATION RATE: 16 BRPM | DIASTOLIC BLOOD PRESSURE: 80 MMHG | WEIGHT: 142 LBS | HEIGHT: 60 IN | BODY MASS INDEX: 27.88 KG/M2

## 2024-04-30 DIAGNOSIS — E11.69 TYPE 2 DIABETES MELLITUS WITH OTHER SPECIFIED COMPLICATION, WITHOUT LONG-TERM CURRENT USE OF INSULIN (HCC): Primary | ICD-10-CM

## 2024-04-30 DIAGNOSIS — F32.89 OTHER DEPRESSION: ICD-10-CM

## 2024-04-30 DIAGNOSIS — E03.8 OTHER SPECIFIED HYPOTHYROIDISM: ICD-10-CM

## 2024-04-30 DIAGNOSIS — I10 ESSENTIAL HYPERTENSION: ICD-10-CM

## 2024-04-30 DIAGNOSIS — E78.49 OTHER HYPERLIPIDEMIA: ICD-10-CM

## 2024-04-30 DIAGNOSIS — E11.9 TYPE 2 DIABETES MELLITUS WITHOUT COMPLICATION, WITHOUT LONG-TERM CURRENT USE OF INSULIN (HCC): ICD-10-CM

## 2024-04-30 PROCEDURE — 3079F DIAST BP 80-89 MM HG: CPT | Performed by: FAMILY MEDICINE

## 2024-04-30 PROCEDURE — G2211 COMPLEX E/M VISIT ADD ON: HCPCS | Performed by: FAMILY MEDICINE

## 2024-04-30 PROCEDURE — 99214 OFFICE O/P EST MOD 30 MIN: CPT | Performed by: FAMILY MEDICINE

## 2024-04-30 PROCEDURE — 3074F SYST BP LT 130 MM HG: CPT | Performed by: FAMILY MEDICINE

## 2024-04-30 NOTE — ASSESSMENT & PLAN NOTE
Stable.  Continue on Paxil 20mg PO daily.  She has been doing well on the medication.  I will consider weaning her off or cut down on Paxil next visit.

## 2024-04-30 NOTE — PROGRESS NOTES
Name: Emelina Lacy      : 1970      MRN: [id number]  Encounter Provider: Dayron Oquendo MD  Encounter Date: 2024   Encounter department: [facility]    Assessment & Plan     1. Type 2 diabetes mellitus with other specified complication, without long-term current use of insulin (HCC)  Assessment & Plan:  A1c is well-controlled.  Discussion about diet and exercise for prevention.  Decrease metformin to 500 mg PO twice a day Ozempic 1 mg SQ weekly.    Lab Results   Component Value Date    HGBA1C 6.2 (H) 2024     Orders:  -     Hemoglobin A1C; Future; Expected date: 2024  -     CBC and differential; Future; Expected date: 2024  -     Comprehensive metabolic panel; Future; Expected date: 2024  -     Lipid Panel with Direct LDL reflex; Future; Expected date: 2024    2. Essential hypertension  Assessment & Plan:  Stable, BP today in office 110/80.  Continue lisinopril 10mg PO daily.      3. Other hyperlipidemia  Assessment & Plan:  Stable, recent lipid panel WNL.  Continue simvastatin 20mg PO daily.      4. Other specified hypothyroidism  Assessment & Plan:  Recent labwork showed decreased TSH with elevated T4.  Levothyroxine recently decreased from 88mcg to 75mcg. Patient to repeat TSH in 2 months to reassess medical management.  Continue levothyroxine 75mcg PO daily.     Orders:  -     TSH, 3rd generation with Free T4 reflex; Future    5. BMI 27.0-27.9,adult  Assessment & Plan:  Patient has lost 16 lbs since her visit on 2024.  Currently managed on Ozempic.       6. Other depression  Assessment & Plan:  Stable.  Continue on Paxil 20mg PO daily.  She has been doing well on the medication.  I will consider weaning her off or cut down on Paxil next visit.      7. Type 2 diabetes mellitus without complication, without long-term current use of insulin (HCC)  Assessment & Plan:  A1c is well-controlled.  Discussion about diet and exercise for  prevention.  Decrease metformin to 500 mg PO twice a day Ozempic 1 mg SQ weekly.    Lab Results   Component Value Date    HGBA1C 6.2 (H) 04/23/2024     Orders:  -     metFORMIN (GLUCOPHAGE) 500 mg tablet; Take 1 tablet (500 mg total) by mouth 2 (two) times a day with meals At 8 am and 8 pm           Rosa Lacy is a 52yo female with PMH of HTN, HLD, hypothyroidism, DM2, and mental disability who presents to the office for 3 month follow-up of her diabetes. Has no acute concerns or complaints at this time. Caregiver notes her diet has not changed, still wants to drink soda and eat junk food.       Review of Systems   Constitutional:  Negative for chills and fever.   HENT:  Negative for ear pain and sore throat.    Eyes:  Negative for pain and visual disturbance.   Respiratory:  Negative for cough and shortness of breath.    Cardiovascular:  Negative for chest pain and palpitations.   Gastrointestinal:  Negative for abdominal pain and vomiting.   Genitourinary:  Negative for dysuria and hematuria.   Musculoskeletal:  Negative for arthralgias and back pain.   Skin:  Negative for color change and rash.   Neurological:  Negative for seizures and syncope.   All other systems reviewed and are negative.      Past Medical History:   Diagnosis Date   • Diabetes mellitus (HCC)    • Disease of thyroid gland    • Hyperlipidemia    • Hypertension    • Intellectual delay    • Seizures (HCC)     h/o, no longer on meds   • Tooth decay      Past Surgical History:   Procedure Laterality Date   • NO PAST SURGERIES       Family History   Problem Relation Age of Onset   • Dementia Mother    • Stroke Mother    • Colon cancer Father    • Mental illness Brother    • No Known Problems Brother    • Ovarian cancer Cousin    • Breast cancer Neg Hx    • Uterine cancer Neg Hx      Social History     Socioeconomic History   • Marital status: Single     Spouse name: None   • Number of children: None   • Years of education: None   •  Highest education level: None   Occupational History   • None   Tobacco Use   • Smoking status: Never   • Smokeless tobacco: Never   • Tobacco comments:     No passive smoke exposure   Vaping Use   • Vaping status: Never Used   Substance and Sexual Activity   • Alcohol use: Never   • Drug use: No   • Sexual activity: Never   Other Topics Concern   • None   Social History Narrative   • None     Social Determinants of Health     Financial Resource Strain: Not on file   Food Insecurity: Not on file   Transportation Needs: Not on file   Physical Activity: Not on file   Stress: Not on file   Social Connections: Not on file   Intimate Partner Violence: Not on file   Housing Stability: Not on file     Current Outpatient Medications on File Prior to Visit   Medication Sig   • Cholecalciferol 50 MCG (2000 UT) TABS Take 2,000 Units by mouth daily   • clotrimazole-betamethasone (LOTRISONE) 1-0.05 % cream Apply topically 2 (two) times a day as needed (rash)   • ergocalciferol (ERGOCALCIFEROL) 1.25 MG (39829 UT) capsule Take 1 capsule (50,000 Units total) by mouth once a week   • fluticasone (FLONASE) 50 mcg/act nasal spray 2 sprays into each nostril daily   • levothyroxine 75 mcg tablet Take 1 tablet (75 mcg total) by mouth daily   • lisinopril (ZESTRIL) 10 mg tablet Take 1 tablet (10 mg total) by mouth daily   • ofloxacin (FLOXIN) 0.3 % otic solution USE 5 DROPS IN BOTH EARS TWICE DAILY   • PARoxetine (PAXIL) 20 mg tablet Take 1 tablet (20 mg total) by mouth daily   • semaglutide, 1 mg/dose, (Ozempic) 4 mg/3 mL injection pen Inject 0.75 mL (1 mg total) under the skin once a week   • simvastatin (ZOCOR) 20 mg tablet Take 1 tablet (20 mg total) by mouth daily at bedtime   • [DISCONTINUED] metFORMIN (GLUCOPHAGE) 1000 MG tablet Take 1 tablet (1,000 mg total) by mouth 2 (two) times a day with meals At 8 am and 8 pm   • amoxicillin (AMOXIL) 500 mg capsule Take 500 mg by mouth 3 (three) times a day (Patient not taking: Reported on  4/30/2024)   • Calcium Citrate-Vitamin D 250 mg-2.5 mcg tablet Take 1 tablet by mouth 2 (two) times a day (Patient not taking: Reported on 3/21/2024)   • econazole nitrate 1 % cream Every 12 hours (Patient not taking: Reported on 11/22/2022)     Allergies   Allergen Reactions   • No Known Allergies      Immunization History   Administered Date(s) Administered   • COVID-19 Moderna Vac BIVALENT 12 Yr+ IM 0.5 ML 01/27/2023, 05/05/2023   • COVID-19 PFIZER VACCINE 0.3 ML IM 11/11/2021, 12/09/2021   • COVID-19 Pfizer mRNA vacc PF mira-sucrose 12 yr and older (Comirnaty) 10/06/2023   • INFLUENZA 08/08/2014, 08/27/2015, 11/01/2016, 09/12/2020, 10/06/2023   • Influenza Split 02/01/2013   • Influenza, injectable, quadrivalent, preservative free 0.5 mL 09/12/2020   • Influenza, recombinant, quadrivalent,injectable, preservative free 11/22/2022   • Influenza, seasonal, injectable 08/27/2015   • Influenza, seasonal, injectable, preservative free 09/16/2013   • Pneumococcal Polysaccharide PPV23 02/01/2013   • Tdap 11/01/2016   • Zoster Vaccine Recombinant 05/05/2023, 10/06/2023       Objective     /80 (BP Location: Left arm, Patient Position: Sitting, Cuff Size: Large)   Pulse (!) 113   Temp (!) 96.4 °F (35.8 °C) (Tympanic)   Resp 16   Ht 5' (1.524 m)   Wt 64.4 kg (142 lb)   SpO2 95%   BMI 27.73 kg/m²     Physical Exam  Vitals and nursing note reviewed.   Constitutional:       Appearance: Normal appearance.   HENT:      Right Ear: Tympanic membrane normal.      Left Ear: Tympanic membrane normal.      Mouth/Throat:      Mouth: Mucous membranes are moist.   Eyes:      Pupils: Pupils are equal, round, and reactive to light.   Cardiovascular:      Rate and Rhythm: Normal rate and regular rhythm.      Pulses: Normal pulses.      Heart sounds: Normal heart sounds.   Pulmonary:      Effort: Pulmonary effort is normal.      Breath sounds: Normal breath sounds.   Skin:     General: Skin is warm.      Capillary Refill:  Capillary refill takes less than 2 seconds.   Neurological:      Mental Status: She is alert.       Dayron Oquendo MD

## 2024-04-30 NOTE — ASSESSMENT & PLAN NOTE
Recent labwork showed decreased TSH with elevated T4.  Levothyroxine recently decreased from 88mcg to 75mcg. Patient to repeat TSH in 2 months to reassess medical management.  Continue levothyroxine 75mcg PO daily.

## 2024-04-30 NOTE — ASSESSMENT & PLAN NOTE
A1c is well-controlled.  Discussion about diet and exercise for prevention.  Decrease metformin to 500 mg PO twice a day Ozempic 1 mg SQ weekly.    Lab Results   Component Value Date    HGBA1C 6.2 (H) 04/23/2024

## 2024-06-07 DIAGNOSIS — E55.9 VITAMIN D INSUFFICIENCY: ICD-10-CM

## 2024-06-07 RX ORDER — ERGOCALCIFEROL 1.25 MG/1
1 CAPSULE ORAL WEEKLY
Qty: 12 CAPSULE | Refills: 1 | Status: SHIPPED | OUTPATIENT
Start: 2024-06-07

## 2024-06-07 RX ORDER — ERGOCALCIFEROL 1.25 MG/1
CAPSULE ORAL
Qty: 12 CAPSULE | Refills: 1 | Status: SHIPPED | OUTPATIENT
Start: 2024-06-07

## 2024-07-08 DIAGNOSIS — E11.9 TYPE 2 DIABETES MELLITUS WITHOUT COMPLICATION, WITHOUT LONG-TERM CURRENT USE OF INSULIN (HCC): ICD-10-CM

## 2024-07-28 ENCOUNTER — APPOINTMENT (OUTPATIENT)
Dept: LAB | Age: 54
End: 2024-07-28
Payer: COMMERCIAL

## 2024-07-28 DIAGNOSIS — E03.8 OTHER SPECIFIED HYPOTHYROIDISM: ICD-10-CM

## 2024-07-28 DIAGNOSIS — E11.69 TYPE 2 DIABETES MELLITUS WITH OTHER SPECIFIED COMPLICATION, WITHOUT LONG-TERM CURRENT USE OF INSULIN (HCC): ICD-10-CM

## 2024-07-28 LAB
ALBUMIN SERPL BCG-MCNC: 4.4 G/DL (ref 3.5–5)
ALP SERPL-CCNC: 124 U/L (ref 34–104)
ALT SERPL W P-5'-P-CCNC: 15 U/L (ref 7–52)
ANION GAP SERPL CALCULATED.3IONS-SCNC: 12 MMOL/L (ref 4–13)
AST SERPL W P-5'-P-CCNC: 18 U/L (ref 13–39)
BASOPHILS # BLD AUTO: 0.03 THOUSANDS/ÂΜL (ref 0–0.1)
BASOPHILS NFR BLD AUTO: 0 % (ref 0–1)
BILIRUB SERPL-MCNC: 0.6 MG/DL (ref 0.2–1)
BUN SERPL-MCNC: 19 MG/DL (ref 5–25)
CALCIUM SERPL-MCNC: 9.2 MG/DL (ref 8.4–10.2)
CHLORIDE SERPL-SCNC: 98 MMOL/L (ref 96–108)
CHOLEST SERPL-MCNC: 128 MG/DL
CO2 SERPL-SCNC: 28 MMOL/L (ref 21–32)
CREAT SERPL-MCNC: 0.68 MG/DL (ref 0.6–1.3)
EOSINOPHIL # BLD AUTO: 0.22 THOUSAND/ÂΜL (ref 0–0.61)
EOSINOPHIL NFR BLD AUTO: 3 % (ref 0–6)
ERYTHROCYTE [DISTWIDTH] IN BLOOD BY AUTOMATED COUNT: 14.4 % (ref 11.6–15.1)
EST. AVERAGE GLUCOSE BLD GHB EST-MCNC: 126 MG/DL
GFR SERPL CREATININE-BSD FRML MDRD: 99 ML/MIN/1.73SQ M
GLUCOSE P FAST SERPL-MCNC: 110 MG/DL (ref 65–99)
HBA1C MFR BLD: 6 %
HCT VFR BLD AUTO: 42.4 % (ref 34.8–46.1)
HDLC SERPL-MCNC: 49 MG/DL
HGB BLD-MCNC: 13.3 G/DL (ref 11.5–15.4)
IMM GRANULOCYTES # BLD AUTO: 0.03 THOUSAND/UL (ref 0–0.2)
IMM GRANULOCYTES NFR BLD AUTO: 0 % (ref 0–2)
LDLC SERPL CALC-MCNC: 57 MG/DL (ref 0–100)
LYMPHOCYTES # BLD AUTO: 2.75 THOUSANDS/ÂΜL (ref 0.6–4.47)
LYMPHOCYTES NFR BLD AUTO: 33 % (ref 14–44)
MCH RBC QN AUTO: 28.8 PG (ref 26.8–34.3)
MCHC RBC AUTO-ENTMCNC: 31.4 G/DL (ref 31.4–37.4)
MCV RBC AUTO: 92 FL (ref 82–98)
MONOCYTES # BLD AUTO: 0.41 THOUSAND/ÂΜL (ref 0.17–1.22)
MONOCYTES NFR BLD AUTO: 5 % (ref 4–12)
NEUTROPHILS # BLD AUTO: 4.94 THOUSANDS/ÂΜL (ref 1.85–7.62)
NEUTS SEG NFR BLD AUTO: 59 % (ref 43–75)
NRBC BLD AUTO-RTO: 0 /100 WBCS
PLATELET # BLD AUTO: 259 THOUSANDS/UL (ref 149–390)
PMV BLD AUTO: 11.8 FL (ref 8.9–12.7)
POTASSIUM SERPL-SCNC: 4.3 MMOL/L (ref 3.5–5.3)
PROT SERPL-MCNC: 8.5 G/DL (ref 6.4–8.4)
RBC # BLD AUTO: 4.62 MILLION/UL (ref 3.81–5.12)
SODIUM SERPL-SCNC: 138 MMOL/L (ref 135–147)
T4 FREE SERPL-MCNC: 0.98 NG/DL (ref 0.61–1.12)
TRIGL SERPL-MCNC: 109 MG/DL
TSH SERPL DL<=0.05 MIU/L-ACNC: 0.25 UIU/ML (ref 0.45–4.5)
WBC # BLD AUTO: 8.38 THOUSAND/UL (ref 4.31–10.16)

## 2024-07-28 PROCEDURE — 83036 HEMOGLOBIN GLYCOSYLATED A1C: CPT

## 2024-07-28 PROCEDURE — 80061 LIPID PANEL: CPT

## 2024-07-28 PROCEDURE — 80053 COMPREHEN METABOLIC PANEL: CPT

## 2024-07-28 PROCEDURE — 84439 ASSAY OF FREE THYROXINE: CPT

## 2024-07-28 PROCEDURE — 3044F HG A1C LEVEL LT 7.0%: CPT | Performed by: FAMILY MEDICINE

## 2024-07-28 PROCEDURE — 84443 ASSAY THYROID STIM HORMONE: CPT

## 2024-07-28 PROCEDURE — 36415 COLL VENOUS BLD VENIPUNCTURE: CPT

## 2024-07-28 PROCEDURE — 85025 COMPLETE CBC W/AUTO DIFF WBC: CPT

## 2024-07-30 ENCOUNTER — OFFICE VISIT (OUTPATIENT)
Dept: FAMILY MEDICINE CLINIC | Facility: CLINIC | Age: 54
End: 2024-07-30
Payer: COMMERCIAL

## 2024-07-30 VITALS
BODY MASS INDEX: 26.27 KG/M2 | HEIGHT: 60 IN | OXYGEN SATURATION: 99 % | TEMPERATURE: 97.8 F | DIASTOLIC BLOOD PRESSURE: 74 MMHG | WEIGHT: 133.8 LBS | RESPIRATION RATE: 16 BRPM | HEART RATE: 87 BPM | SYSTOLIC BLOOD PRESSURE: 110 MMHG

## 2024-07-30 DIAGNOSIS — E03.8 OTHER SPECIFIED HYPOTHYROIDISM: ICD-10-CM

## 2024-07-30 DIAGNOSIS — E11.9 TYPE 2 DIABETES MELLITUS WITHOUT COMPLICATION, WITHOUT LONG-TERM CURRENT USE OF INSULIN (HCC): Primary | ICD-10-CM

## 2024-07-30 DIAGNOSIS — E78.49 OTHER HYPERLIPIDEMIA: ICD-10-CM

## 2024-07-30 DIAGNOSIS — I10 ESSENTIAL HYPERTENSION: ICD-10-CM

## 2024-07-30 PROCEDURE — 3078F DIAST BP <80 MM HG: CPT | Performed by: FAMILY MEDICINE

## 2024-07-30 PROCEDURE — 3725F SCREEN DEPRESSION PERFORMED: CPT | Performed by: FAMILY MEDICINE

## 2024-07-30 PROCEDURE — G2211 COMPLEX E/M VISIT ADD ON: HCPCS | Performed by: FAMILY MEDICINE

## 2024-07-30 PROCEDURE — 99214 OFFICE O/P EST MOD 30 MIN: CPT | Performed by: FAMILY MEDICINE

## 2024-07-30 PROCEDURE — 4010F ACE/ARB THERAPY RXD/TAKEN: CPT | Performed by: FAMILY MEDICINE

## 2024-07-30 PROCEDURE — 3074F SYST BP LT 130 MM HG: CPT | Performed by: FAMILY MEDICINE

## 2024-07-30 RX ORDER — LISINOPRIL 5 MG/1
5 TABLET ORAL DAILY
Qty: 90 TABLET | Refills: 1 | Status: SHIPPED | OUTPATIENT
Start: 2024-07-30

## 2024-07-30 RX ORDER — LEVOTHYROXINE SODIUM 0.05 MG/1
50 TABLET ORAL DAILY
Qty: 90 TABLET | Refills: 1 | Status: SHIPPED | OUTPATIENT
Start: 2024-07-30

## 2024-07-30 NOTE — ASSESSMENT & PLAN NOTE
Well-controlled.  Continue on Ozempic 1 mg weekly.  I am going to stop metformin.  Continue to follow low-carb diet.  Continue to monitor fasting glucose and A1c.  Lab Results   Component Value Date    HGBA1C 6.0 (H) 07/28/2024

## 2024-07-30 NOTE — ASSESSMENT & PLAN NOTE
Recent labwork showed decreased TSH with elevated T4.  I am going to decrease levothyroxine to 50 mcg daily. Patient to repeat TSH in 2 months to reassess medical management.

## 2024-08-30 DIAGNOSIS — E55.9 VITAMIN D INSUFFICIENCY: ICD-10-CM

## 2024-08-30 DIAGNOSIS — F41.9 ANXIETY: ICD-10-CM

## 2024-08-31 RX ORDER — PAROXETINE 20 MG/1
20 TABLET, FILM COATED ORAL DAILY
Qty: 90 TABLET | Refills: 1 | Status: SHIPPED | OUTPATIENT
Start: 2024-08-31

## 2024-10-21 DIAGNOSIS — E11.69 TYPE 2 DIABETES MELLITUS WITH OTHER SPECIFIED COMPLICATION, WITHOUT LONG-TERM CURRENT USE OF INSULIN (HCC): ICD-10-CM

## 2024-10-22 RX ORDER — SEMAGLUTIDE 1.34 MG/ML
INJECTION, SOLUTION SUBCUTANEOUS
Qty: 9 ML | Refills: 1 | Status: SHIPPED | OUTPATIENT
Start: 2024-10-22

## 2024-10-29 DIAGNOSIS — E78.49 OTHER HYPERLIPIDEMIA: ICD-10-CM

## 2024-10-29 RX ORDER — SIMVASTATIN 20 MG
20 TABLET ORAL
Qty: 100 TABLET | Refills: 1 | Status: SHIPPED | OUTPATIENT
Start: 2024-10-29

## 2024-11-12 DIAGNOSIS — E55.9 VITAMIN D INSUFFICIENCY: ICD-10-CM

## 2024-11-13 RX ORDER — ERGOCALCIFEROL 1.25 MG/1
CAPSULE, LIQUID FILLED ORAL
Qty: 12 CAPSULE | Refills: 1 | Status: SHIPPED | OUTPATIENT
Start: 2024-11-13

## 2025-01-11 ENCOUNTER — APPOINTMENT (OUTPATIENT)
Dept: LAB | Facility: IMAGING CENTER | Age: 55
End: 2025-01-11
Payer: COMMERCIAL

## 2025-01-11 DIAGNOSIS — E11.9 TYPE 2 DIABETES MELLITUS WITHOUT COMPLICATION, WITHOUT LONG-TERM CURRENT USE OF INSULIN (HCC): ICD-10-CM

## 2025-01-11 LAB
ALBUMIN SERPL BCG-MCNC: 4.3 G/DL (ref 3.5–5)
ALP SERPL-CCNC: 110 U/L (ref 34–104)
ALT SERPL W P-5'-P-CCNC: 19 U/L (ref 7–52)
ANION GAP SERPL CALCULATED.3IONS-SCNC: 9 MMOL/L (ref 4–13)
AST SERPL W P-5'-P-CCNC: 23 U/L (ref 13–39)
BASOPHILS # BLD AUTO: 0.02 THOUSANDS/ΜL (ref 0–0.1)
BASOPHILS NFR BLD AUTO: 0 % (ref 0–1)
BILIRUB SERPL-MCNC: 0.69 MG/DL (ref 0.2–1)
BUN SERPL-MCNC: 20 MG/DL (ref 5–25)
CALCIUM SERPL-MCNC: 8.8 MG/DL (ref 8.4–10.2)
CHLORIDE SERPL-SCNC: 102 MMOL/L (ref 96–108)
CHOLEST SERPL-MCNC: 131 MG/DL (ref ?–200)
CO2 SERPL-SCNC: 30 MMOL/L (ref 21–32)
CREAT SERPL-MCNC: 0.67 MG/DL (ref 0.6–1.3)
EOSINOPHIL # BLD AUTO: 0.16 THOUSAND/ΜL (ref 0–0.61)
EOSINOPHIL NFR BLD AUTO: 2 % (ref 0–6)
ERYTHROCYTE [DISTWIDTH] IN BLOOD BY AUTOMATED COUNT: 14.7 % (ref 11.6–15.1)
EST. AVERAGE GLUCOSE BLD GHB EST-MCNC: 134 MG/DL
GFR SERPL CREATININE-BSD FRML MDRD: 99 ML/MIN/1.73SQ M
GLUCOSE P FAST SERPL-MCNC: 109 MG/DL (ref 65–99)
HBA1C MFR BLD: 6.3 %
HCT VFR BLD AUTO: 39.4 % (ref 34.8–46.1)
HDLC SERPL-MCNC: 58 MG/DL
HGB BLD-MCNC: 12.4 G/DL (ref 11.5–15.4)
IMM GRANULOCYTES # BLD AUTO: 0.01 THOUSAND/UL (ref 0–0.2)
IMM GRANULOCYTES NFR BLD AUTO: 0 % (ref 0–2)
LDLC SERPL CALC-MCNC: 61 MG/DL (ref 0–100)
LYMPHOCYTES # BLD AUTO: 2.27 THOUSANDS/ΜL (ref 0.6–4.47)
LYMPHOCYTES NFR BLD AUTO: 32 % (ref 14–44)
MCH RBC QN AUTO: 28.9 PG (ref 26.8–34.3)
MCHC RBC AUTO-ENTMCNC: 31.5 G/DL (ref 31.4–37.4)
MCV RBC AUTO: 92 FL (ref 82–98)
MONOCYTES # BLD AUTO: 0.46 THOUSAND/ΜL (ref 0.17–1.22)
MONOCYTES NFR BLD AUTO: 6 % (ref 4–12)
NEUTROPHILS # BLD AUTO: 4.22 THOUSANDS/ΜL (ref 1.85–7.62)
NEUTS SEG NFR BLD AUTO: 60 % (ref 43–75)
NRBC BLD AUTO-RTO: 0 /100 WBCS
PLATELET # BLD AUTO: 206 THOUSANDS/UL (ref 149–390)
PMV BLD AUTO: 11.9 FL (ref 8.9–12.7)
POTASSIUM SERPL-SCNC: 4.4 MMOL/L (ref 3.5–5.3)
PROT SERPL-MCNC: 7.9 G/DL (ref 6.4–8.4)
RBC # BLD AUTO: 4.29 MILLION/UL (ref 3.81–5.12)
SODIUM SERPL-SCNC: 141 MMOL/L (ref 135–147)
T4 FREE SERPL-MCNC: 0.54 NG/DL (ref 0.61–1.12)
TRIGL SERPL-MCNC: 62 MG/DL (ref ?–150)
TSH SERPL DL<=0.05 MIU/L-ACNC: 6.01 UIU/ML (ref 0.45–4.5)
WBC # BLD AUTO: 7.14 THOUSAND/UL (ref 4.31–10.16)

## 2025-01-11 PROCEDURE — 85025 COMPLETE CBC W/AUTO DIFF WBC: CPT

## 2025-01-11 PROCEDURE — 80053 COMPREHEN METABOLIC PANEL: CPT

## 2025-01-11 PROCEDURE — 36415 COLL VENOUS BLD VENIPUNCTURE: CPT

## 2025-01-11 PROCEDURE — 84439 ASSAY OF FREE THYROXINE: CPT

## 2025-01-11 PROCEDURE — 84443 ASSAY THYROID STIM HORMONE: CPT

## 2025-01-11 PROCEDURE — 80061 LIPID PANEL: CPT

## 2025-01-11 PROCEDURE — 83036 HEMOGLOBIN GLYCOSYLATED A1C: CPT

## 2025-01-12 ENCOUNTER — RESULTS FOLLOW-UP (OUTPATIENT)
Dept: FAMILY MEDICINE CLINIC | Facility: CLINIC | Age: 55
End: 2025-01-12

## 2025-01-12 DIAGNOSIS — E03.8 OTHER SPECIFIED HYPOTHYROIDISM: ICD-10-CM

## 2025-01-12 RX ORDER — LEVOTHYROXINE SODIUM 75 UG/1
75 TABLET ORAL DAILY
Qty: 90 TABLET | Refills: 1 | Status: SHIPPED | OUTPATIENT
Start: 2025-01-12

## 2025-01-14 ENCOUNTER — OFFICE VISIT (OUTPATIENT)
Dept: FAMILY MEDICINE CLINIC | Facility: CLINIC | Age: 55
End: 2025-01-14
Payer: COMMERCIAL

## 2025-01-14 VITALS
WEIGHT: 154.8 LBS | DIASTOLIC BLOOD PRESSURE: 90 MMHG | HEART RATE: 88 BPM | HEIGHT: 61 IN | TEMPERATURE: 96.6 F | OXYGEN SATURATION: 97 % | RESPIRATION RATE: 16 BRPM | SYSTOLIC BLOOD PRESSURE: 122 MMHG | BODY MASS INDEX: 29.23 KG/M2

## 2025-01-14 DIAGNOSIS — I10 ESSENTIAL HYPERTENSION: ICD-10-CM

## 2025-01-14 DIAGNOSIS — E03.8 OTHER SPECIFIED HYPOTHYROIDISM: ICD-10-CM

## 2025-01-14 DIAGNOSIS — J02.9 SORE THROAT: Primary | ICD-10-CM

## 2025-01-14 DIAGNOSIS — E78.49 OTHER HYPERLIPIDEMIA: ICD-10-CM

## 2025-01-14 DIAGNOSIS — E11.69 TYPE 2 DIABETES MELLITUS WITH OTHER SPECIFIED COMPLICATION, WITHOUT LONG-TERM CURRENT USE OF INSULIN (HCC): ICD-10-CM

## 2025-01-14 PROCEDURE — 99214 OFFICE O/P EST MOD 30 MIN: CPT | Performed by: FAMILY MEDICINE

## 2025-01-14 RX ORDER — IBUPROFEN 600 MG/1
600 TABLET, FILM COATED ORAL EVERY 8 HOURS PRN
Qty: 30 TABLET | Refills: 0 | Status: SHIPPED | OUTPATIENT
Start: 2025-01-14

## 2025-01-14 RX ORDER — LANOLIN ALCOHOL/MO/W.PET/CERES
1 CREAM (GRAM) TOPICAL DAILY
COMMUNITY
Start: 2024-12-21

## 2025-01-14 NOTE — ASSESSMENT & PLAN NOTE
- Not well-controlled. Increase levothyroxine to 75mcg daily based on recent labs.  - Will continue to monitor TSH and T4.

## 2025-01-14 NOTE — TELEPHONE ENCOUNTER
----- Message from Dayron Oquendo MD sent at 1/12/2025 12:56 PM EST -----  A1c is well-controlled at 6.3.  TSH came back elevated with low T4.  I increase levothyroxine to 75 mcg daily and I sent the prescription.  All the rest of blood work came back stable.

## 2025-01-14 NOTE — ASSESSMENT & PLAN NOTE
- Well-controlled on Ozempic 1mg weekly.   - Encourage low carb diet and exercise.  - Will continue to monitor fasting glucose and A1C.   Lab Results   Component Value Date    HGBA1C 6.3 (H) 01/11/2025       Orders:  •  Albumin / creatinine urine ratio; Future  •  Comprehensive metabolic panel; Future  •  Hemoglobin A1C; Future  •  Lipid Panel with Direct LDL reflex; Future  •  TSH, 3rd generation with Free T4 reflex; Future  •  CBC and differential; Future

## 2025-01-14 NOTE — PROGRESS NOTES
Name: Emelina Lacy      : 1970      MRN: [id number]  Encounter Provider: Dayron Oquendo MD  Encounter Date: 2025   Encounter department: [facility]  :  Assessment & Plan  Sore throat  - Advised to take ibuprofen as needed for pain.  - Referral to ENT. Patient has ENT and will call to schedule appointment for evaluation.   Orders:  •  ibuprofen (MOTRIN) 600 mg tablet; Take 1 tablet (600 mg total) by mouth every 8 (eight) hours as needed for mild pain  •  Ambulatory Referral to Otolaryngology; Future    Type 2 diabetes mellitus with other specified complication, without long-term current use of insulin (HCC)  - Well-controlled on Ozempic 1mg weekly.   - Encourage low carb diet and exercise.  - Will continue to monitor fasting glucose and A1C.   Lab Results   Component Value Date    HGBA1C 6.3 (H) 2025       Orders:  •  Albumin / creatinine urine ratio; Future  •  Comprehensive metabolic panel; Future  •  Hemoglobin A1C; Future  •  Lipid Panel with Direct LDL reflex; Future  •  TSH, 3rd generation with Free T4 reflex; Future  •  CBC and differential; Future    Other specified hypothyroidism  - Not well-controlled. Increase levothyroxine to 75mcg daily based on recent labs.  - Will continue to monitor TSH and T4.        Essential hypertension  - Fair control on lisinopril 10mg daily. Continue same.  - Will continue to monitor.        Other hyperlipidemia  - Stable on simvastatin 20mg daily. Continue same.  - Encourage low fat diet and regular exercise.  - Will continue to monitor fasting lipid profile.               History of Present Illness     Patient with PMH of type 2 diabetes, HTN, hyperlipidemia, hypothyroidism presenting to the office with her caregiver due to sore throat x 3 months. Her caregiver reports that she has got dentures prior to these symptoms starting. She reports pain when swallowing. Her caregiver reports she has had a few choking episodes  "when eating as well. She denies any fevers/chills, inability to swallow foods/liquids. The caregiver also reports she does not drink fluids frequently with eating. We reviewed her labs showing A1C of 6.3, elevated TSH with decreased T4 otherwise labs stable. Her levothyroxine was increased to 75mcg. She has no other specific concerns for today's visit.     Sore Throat   Pertinent negatives include no abdominal pain, congestion, coughing, ear pain, headaches, shortness of breath, trouble swallowing or vomiting.     Review of Systems   Constitutional:  Negative for chills, fatigue and fever.   HENT:  Positive for sore throat. Negative for congestion, ear pain, trouble swallowing and voice change.    Eyes:  Negative for pain.   Respiratory:  Negative for cough and shortness of breath.    Cardiovascular:  Negative for chest pain and palpitations.   Gastrointestinal:  Negative for abdominal pain, nausea and vomiting.   Genitourinary:  Negative for dysuria.   Musculoskeletal:  Negative for arthralgias and back pain.   Skin:  Negative for rash.   Neurological:  Negative for dizziness and headaches.       Objective   /90 (BP Location: Left arm, Patient Position: Sitting, Cuff Size: Standard)   Pulse 88   Temp (!) 96.6 °F (35.9 °C) (Tympanic)   Resp 16   Ht 5' 1\" (1.549 m)   Wt 70.2 kg (154 lb 12.8 oz)   SpO2 97%   BMI 29.25 kg/m²      Physical Exam  Vitals and nursing note reviewed.   Constitutional:       General: She is not in acute distress.     Appearance: She is well-developed.   HENT:      Head: Normocephalic and atraumatic.      Right Ear: Tympanic membrane and ear canal normal.      Left Ear: Tympanic membrane and ear canal normal.      Mouth/Throat:      Comments: Food present in mouth  Eyes:      Conjunctiva/sclera: Conjunctivae normal.   Cardiovascular:      Rate and Rhythm: Normal rate and regular rhythm.      Heart sounds: No murmur heard.  Pulmonary:      Effort: Pulmonary effort is normal. No " respiratory distress.      Breath sounds: Normal breath sounds.   Abdominal:      Palpations: Abdomen is soft.      Tenderness: There is no abdominal tenderness.   Musculoskeletal:         General: No swelling.      Cervical back: Neck supple.   Skin:     General: Skin is warm and dry.   Neurological:      Mental Status: She is alert.   Psychiatric:         Mood and Affect: Mood normal.

## 2025-01-14 NOTE — ASSESSMENT & PLAN NOTE
- Stable on simvastatin 20mg daily. Continue same.  - Encourage low fat diet and regular exercise.  - Will continue to monitor fasting lipid profile.

## 2025-01-15 NOTE — ASSESSMENT & PLAN NOTE
- Advised to take ibuprofen as needed for pain.  - Referral to ENT. Patient has ENT and will call to schedule appointment for evaluation.   Orders:  •  ibuprofen (MOTRIN) 600 mg tablet; Take 1 tablet (600 mg total) by mouth every 8 (eight) hours as needed for mild pain  •  Ambulatory Referral to Otolaryngology; Future

## 2025-01-27 DIAGNOSIS — L30.9 DERMATITIS: ICD-10-CM

## 2025-01-27 RX ORDER — CLOTRIMAZOLE AND BETAMETHASONE DIPROPIONATE 10; .64 MG/G; MG/G
CREAM TOPICAL 2 TIMES DAILY PRN
Qty: 45 G | Refills: 2 | Status: SHIPPED | OUTPATIENT
Start: 2025-01-27

## 2025-02-04 ENCOUNTER — OFFICE VISIT (OUTPATIENT)
Dept: FAMILY MEDICINE CLINIC | Facility: CLINIC | Age: 55
End: 2025-02-04
Payer: COMMERCIAL

## 2025-02-04 VITALS
TEMPERATURE: 96.3 F | HEART RATE: 68 BPM | DIASTOLIC BLOOD PRESSURE: 80 MMHG | BODY MASS INDEX: 29.27 KG/M2 | OXYGEN SATURATION: 99 % | SYSTOLIC BLOOD PRESSURE: 128 MMHG | WEIGHT: 155 LBS | HEIGHT: 61 IN | RESPIRATION RATE: 16 BRPM

## 2025-02-04 DIAGNOSIS — Z11.1 ENCOUNTER FOR PPD TEST: ICD-10-CM

## 2025-02-04 DIAGNOSIS — E78.49 OTHER HYPERLIPIDEMIA: ICD-10-CM

## 2025-02-04 DIAGNOSIS — R09.89 RUNNY NOSE: ICD-10-CM

## 2025-02-04 DIAGNOSIS — Z00.00 MEDICARE ANNUAL WELLNESS VISIT, SUBSEQUENT: ICD-10-CM

## 2025-02-04 DIAGNOSIS — E11.69 TYPE 2 DIABETES MELLITUS WITH OTHER SPECIFIED COMPLICATION, WITHOUT LONG-TERM CURRENT USE OF INSULIN (HCC): Primary | ICD-10-CM

## 2025-02-04 DIAGNOSIS — K59.03 DRUG-INDUCED CONSTIPATION: ICD-10-CM

## 2025-02-04 DIAGNOSIS — F32.89 OTHER DEPRESSION: ICD-10-CM

## 2025-02-04 DIAGNOSIS — I10 ESSENTIAL HYPERTENSION: ICD-10-CM

## 2025-02-04 PROCEDURE — 99214 OFFICE O/P EST MOD 30 MIN: CPT | Performed by: FAMILY MEDICINE

## 2025-02-04 PROCEDURE — G0439 PPPS, SUBSEQ VISIT: HCPCS | Performed by: FAMILY MEDICINE

## 2025-02-04 PROCEDURE — 86580 TB INTRADERMAL TEST: CPT

## 2025-02-04 PROCEDURE — G2211 COMPLEX E/M VISIT ADD ON: HCPCS | Performed by: FAMILY MEDICINE

## 2025-02-04 RX ORDER — DOCUSATE SODIUM 100 MG/1
100 CAPSULE, LIQUID FILLED ORAL 2 TIMES DAILY
Qty: 60 CAPSULE | Refills: 5 | Status: SHIPPED | OUTPATIENT
Start: 2025-02-04

## 2025-02-04 RX ORDER — AZELASTINE 1 MG/ML
1 SPRAY, METERED NASAL 2 TIMES DAILY
Qty: 30 ML | Refills: 1 | Status: SHIPPED | OUTPATIENT
Start: 2025-02-04

## 2025-02-04 RX ORDER — PAROXETINE 10 MG/1
TABLET, FILM COATED ORAL
Qty: 30 TABLET | Refills: 2 | Status: SHIPPED | OUTPATIENT
Start: 2025-02-04

## 2025-02-04 NOTE — ASSESSMENT & PLAN NOTE
Currently well controlled on Paxil 20 mg daily. Patient would like to work towards discontinuing the medication.     Dosage reduced to 10 mg daily. Patient will continue this regimen for 1 month then begin splitting the pills to take 5 mg daily for another month. Will then begin taking the 5 mg every other day for the last month before discontinuing. Patient instructed to contact our office if she begins to experience worsening depression symptoms.    Orders:  •  PARoxetine (PAXIL) 10 mg tablet; 1 tablet daily for 1 month, then half tablet daily for 1 month then half tablet every other day for 1 month then stop

## 2025-02-04 NOTE — PROGRESS NOTES
Name: Emelina Layc      : 1970      MRN: [id number]  Encounter Provider: Dayron Oquendo MD  Encounter Date: 2025   Encounter department: [facility]    Assessment & Plan  Type 2 diabetes mellitus with other specified complication, without long-term current use of insulin ([facility])    Lab Results   Component Value Date    HGBA1C 6.3 (H) 2025          Drug-induced constipation    Orders:  •  docusate sodium (COLACE) 100 mg capsule; Take 1 capsule (100 mg total) by mouth 2 (two) times a day    Other depression    Orders:  •  PARoxetine (PAXIL) 10 mg tablet; 1 tablet daily for 1 month, then half tablet daily for 1 month then half tablet every other day for 1 month then stop    Runny nose    Orders:  •  azelastine (ASTELIN) 0.1 % nasal spray; 1 spray into each nostril 2 (two) times a day Use in each nostril as directed    Other hyperlipidemia         Essential hypertension         Encounter for PPD test    Orders:  •  TB Skin Test    Medicare annual wellness visit, subsequent  Patient presents today with caregiver. Immunizations, diet, exercise and safety measures were discussed           Preventive health issues were discussed with patient, and age appropriate screening tests were ordered as noted in patient's After Visit Summary. Personalized health advice and appropriate referrals for health education or preventive services given if needed, as noted in patient's After Visit Summary.    History of Present Illness     HPI   Patient Care Team:  Dayron Oquendo MD as PCP - General (Family Medicine)  Sheryl Gordon MD as PCP - PCP-[employer] ([facility])    Review of Systems  Medical History Reviewed by provider this encounter:  Tobacco  Allergies  Meds  Problems  Med Hx  Surg Hx  Fam Hx       Annual Wellness Visit Questionnaire   Emelina is here for her Subsequent Wellness visit.     Health Risk Assessment:   Patient rates overall health as very good. Patient  feels that their physical health rating is same. Patient is very satisfied with their life. Eyesight was rated as same. Hearing was rated as same. Patient feels that their emotional and mental health rating is same. Patients states they are never, rarely angry. Patient states they are never, rarely unusually tired/fatigued. Pain experienced in the last 7 days has been some. Patient's pain rating has been 2/10. Patient states that she has experienced no weight loss or gain in last 6 months.     Depression Screening:   PHQ-9 Score: 0      Fall Risk Screening:   In the past year, patient has experienced: history of falling in past year    Number of falls: 1  Injured during fall?: No    Feels unsteady when standing or walking?: No    Worried about falling?: No      Urinary Incontinence Screening:   Patient has leaked urine accidently in the last six months.     Home Safety:  Patient does not have trouble with stairs inside or outside of their home. Patient has working smoke alarms and has working carbon monoxide detector. Home safety hazards include: none.     Nutrition:   Current diet is Diabetic and Limited junk food.     Medications:   Patient is currently taking over-the-counter supplements. OTC medications include: see medication list. Patient is not able to manage medications.     Activities of Daily Living (ADLs)/Instrumental Activities of Daily Living (IADLs):   Walk and transfer into and out of bed and chair?: Yes  Dress and groom yourself?: Yes    Bathe or shower yourself?: Yes    Feed yourself? Yes  Do your laundry/housekeeping?: No  Manage your money, pay your bills and track your expenses?: No  Make your own meals?: No    Do your own shopping?: No    Previous Hospitalizations:   Any hospitalizations or ED visits within the last 12 months?: No      Advance Care Planning:   Living will: No    Durable POA for healthcare: No    Advanced directive: No      Cognitive Screening:   Provider or  "family/friend/caregiver concerned regarding cognition?: No    PREVENTIVE SCREENINGS      Cardiovascular Screening:    General: Screening Not Indicated and History Lipid Disorder      Diabetes Screening:     General: Screening Not Indicated and History Diabetes      Colorectal Cancer Screening:     General: Risks and Benefits Discussed      Breast Cancer Screening:     General: Screening Current      Cervical Cancer Screening:    General: Screening Current      Osteoporosis Screening:    General: Risks and Benefits Discussed      Abdominal Aortic Aneurysm (AAA) Screening:        General: Screening Not Indicated      Lung Cancer Screening:     General: Screening Not Indicated      Hepatitis C Screening:    General: Screening Current    Screening, Brief Intervention, and Referral to Treatment (SBIRT)    Screening  Typical number of drinks in a day: 0  Typical number of drinks in a week: 0  Interpretation: Low risk drinking behavior.    Single Item Drug Screening:  How often have you used an illegal drug (including marijuana) or a prescription medication for non-medical reasons in the past year? never    Single Item Drug Screen Score: 0  Interpretation: Negative screen for possible drug use disorder    Other Counseling Topics:   Calcium and vitamin D intake and regular weightbearing exercise.        No results found.    Objective   /80 (BP Location: Left arm, Patient Position: Sitting, Cuff Size: Standard)   Pulse 68   Temp (!) 96.3 °F (35.7 °C) (Tympanic)   Resp 16   Ht 5' 1\" (1.549 m)   Wt 70.3 kg (155 lb)   SpO2 99%   BMI 29.29 kg/m²     Physical Exam    "

## 2025-02-04 NOTE — PROGRESS NOTES
Name: Emelina Lacy      : 1970      MRN: [id number]  Encounter Provider: Dayron Oquendo MD  Encounter Date: 2025   Encounter department: [facility]  :  Assessment & Plan  Type 2 diabetes mellitus with other specified complication, without long-term current use of insulin (HCC)  Well-controlled on Ozempic 1mg weekly.   Encourage low carb diet and exercise.  Will continue to monitor fasting glucose and A1C.  Lab Results   Component Value Date    HGBA1C 6.3 (H) 2025            Drug-induced constipation  The patient has been experiencing increased constipation as noted in the HPI. Likely secondary to Ozempic use. Some symptom relief noted on Colace 50 mg twice daily. Will send order for Colace 100 mg twice daily for further treatment. Patient and caregiver instructed to call the office if symptoms worsen or fail to improve.   Orders:  •  docusate sodium (COLACE) 100 mg capsule; Take 1 capsule (100 mg total) by mouth 2 (two) times a day    Other depression  Currently well controlled on Paxil 20 mg daily. Patient would like to work towards discontinuing the medication.     Dosage reduced to 10 mg daily. Patient will continue this regimen for 1 month then begin splitting the pills to take 5 mg daily for another month. Will then begin taking the 5 mg every other day for the last month before discontinuing. Patient instructed to contact our office if she begins to experience worsening depression symptoms.    Orders:  •  PARoxetine (PAXIL) 10 mg tablet; 1 tablet daily for 1 month, then half tablet daily for 1 month then half tablet every other day for 1 month then stop    Runny nose  Chronic rhinorrhea noted during today's exam. Limited relief with Zyrtec and Flonase use. ENT appointment scheduled for 2025. Will prescribe Astelin for treatment until she can follow-up with ENT.  Orders:  •  azelastine (ASTELIN) 0.1 % nasal spray; 1 spray into each nostril 2 (two)  times a day Use in each nostril as directed    Other hyperlipidemia  Currently well controlled on simvastatin 20mg daily. Will order follow-up lab work at her next visit in 3 months.       Essential hypertension  Managed on lisinopril 5mg daily. Continue to monitor clinically.        Encounter for PPD test    Orders:  •  TB Skin Test    Medicare annual wellness visit, subsequent  Patient presents today with caregiver. Immunizations, diet, exercise and safety measures were discussed.               History of Present Illness   Emelina Lacy is a 54-year-old female with a PMH significant for HTN, HLD, DM, obesity, anxiety, and depression who presents for her medicare annual wellness visit. Over the last 2 months, the patient has been experiencing increased constipation. Her caregiver suspects that she was having about 2 bowel movements throughout the week. They began taking Colace 50mg twice daily and some relief was noted. The patient's caregiver also notes chronic rhinorrhea which has recently worsened. No improvement noted with Flonase or Zyrtec use. Otherwise, the patient denies chest pain, shortness of breath, fevers, chills, N/V/D. She needs her TB PPD testing for her facility. They would like to work towards discontinuing the patient's Paxil.      Review of Systems   Constitutional:  Negative for chills and fever.   HENT:  Positive for rhinorrhea. Negative for ear pain and sore throat.    Eyes:  Negative for pain and visual disturbance.   Respiratory:  Negative for cough and shortness of breath.    Cardiovascular:  Negative for chest pain and palpitations.   Gastrointestinal:  Positive for constipation. Negative for abdominal pain and vomiting.   Genitourinary:  Negative for dysuria and hematuria.   Musculoskeletal:  Negative for arthralgias and back pain.   Skin:  Negative for color change and rash.   Neurological:  Negative for seizures and syncope.   All other systems reviewed and are negative.      Objective  "  /80 (BP Location: Left arm, Patient Position: Sitting, Cuff Size: Standard)   Pulse 68   Temp (!) 96.3 °F (35.7 °C) (Tympanic)   Resp 16   Ht 5' 1\" (1.549 m)   Wt 70.3 kg (155 lb)   SpO2 99%   BMI 29.29 kg/m²      Physical Exam  Vitals and nursing note reviewed.   Constitutional:       General: She is not in acute distress.     Appearance: She is well-developed.   HENT:      Head: Normocephalic and atraumatic.   Eyes:      Conjunctiva/sclera: Conjunctivae normal.   Cardiovascular:      Rate and Rhythm: Normal rate and regular rhythm.      Pulses: no weak pulses.           Dorsalis pedis pulses are 2+ on the right side and 2+ on the left side.      Heart sounds: No murmur heard.  Pulmonary:      Effort: Pulmonary effort is normal. No respiratory distress.      Breath sounds: Normal breath sounds.   Abdominal:      Palpations: Abdomen is soft.      Tenderness: There is no abdominal tenderness.   Musculoskeletal:         General: No swelling.      Cervical back: Neck supple.   Feet:      Right foot:      Skin integrity: Dry skin present. No ulcer, skin breakdown, erythema, warmth or callus.      Left foot:      Skin integrity: Dry skin present. No ulcer, skin breakdown, erythema, warmth or callus.   Skin:     General: Skin is warm and dry.      Capillary Refill: Capillary refill takes less than 2 seconds.   Neurological:      Mental Status: She is alert.   Psychiatric:         Mood and Affect: Mood normal.     Patient's shoes and socks removed.    Right Foot/Ankle   Right Foot Inspection  Skin Exam: skin normal, skin intact and dry skin. No warmth, no callus, no erythema, no maceration, no abnormal color, no pre-ulcer, no ulcer and no callus.     Toe Exam: ROM and strength within normal limits.     Sensory   Monofilament testing: intact    Vascular  Capillary refills: < 3 seconds  The right DP pulse is 2+.     Left Foot/Ankle  Left Foot Inspection  Skin Exam: skin normal, skin intact and dry skin. No " warmth, no erythema, no maceration, normal color, no pre-ulcer, no ulcer and no callus.     Toe Exam: ROM and strength within normal limits.     Sensory   Monofilament testing: intact    Vascular  Capillary refills: < 3 seconds  The left DP pulse is 2+.     Assign Risk Category  No deformity present  No loss of protective sensation  No weak pulses  Risk: 0

## 2025-02-04 NOTE — ASSESSMENT & PLAN NOTE
Currently well controlled on simvastatin 20mg daily. Will order follow-up lab work at her next visit in 3 months.

## 2025-02-04 NOTE — PATIENT INSTRUCTIONS
Medicare Preventive Visit Patient Instructions  Thank you for completing your Welcome to Medicare Visit or Medicare Annual Wellness Visit today. Your next wellness visit will be due in one year (2/5/2026).  The screening/preventive services that you may require over the next 5-10 years are detailed below. Some tests may not apply to you based off risk factors and/or age. Screening tests ordered at today's visit but not completed yet may show as past due. Also, please note that scanned in results may not display below.  Preventive Screenings:  Service Recommendations Previous Testing/Comments   Colorectal Cancer Screening  * Colonoscopy    * Fecal Occult Blood Test (FOBT)/Fecal Immunochemical Test (FIT)  * Fecal DNA/Cologuard Test  * Flexible Sigmoidoscopy Age: 45-75 years old   Colonoscopy: every 10 years (may be performed more frequently if at higher risk)  OR  FOBT/FIT: every 1 year  OR  Cologuard: every 3 years  OR  Sigmoidoscopy: every 5 years  Screening may be recommended earlier than age 45 if at higher risk for colorectal cancer. Also, an individualized decision between you and your healthcare provider will decide whether screening between the ages of 76-85 would be appropriate. Colonoscopy: Not on file  FOBT/FIT: Not on file  Cologuard: Not on file  Sigmoidoscopy: Not on file          Breast Cancer Screening Age: 40+ years old  Frequency: every 1-2 years  Not required if history of left and right mastectomy Mammogram: 03/21/2024        Cervical Cancer Screening Between the ages of 21-29, pap smear recommended once every 3 years.   Between the ages of 30-65, can perform pap smear with HPV co-testing every 5 years.   Recommendations may differ for women with a history of total hysterectomy, cervical cancer, or abnormal pap smears in past. Pap Smear: 03/21/2024        Hepatitis C Screening Once for adults born between 1945 and 1965  More frequently in patients at high risk for Hepatitis C Hep C Antibody:  01/27/2023        Diabetes Screening 1-2 times per year if you're at risk for diabetes or have pre-diabetes Fasting glucose: 109 mg/dL (1/11/2025)  A1C: 6.3 % (1/11/2025)      Cholesterol Screening Once every 5 years if you don't have a lipid disorder. May order more often based on risk factors. Lipid panel: 01/11/2025          Other Preventive Screenings Covered by Medicare:  Abdominal Aortic Aneurysm (AAA) Screening: covered once if your at risk. You're considered to be at risk if you have a family history of AAA.  Lung Cancer Screening: covers low dose CT scan once per year if you meet all of the following conditions: (1) Age 55-77; (2) No signs or symptoms of lung cancer; (3) Current smoker or have quit smoking within the last 15 years; (4) You have a tobacco smoking history of at least 20 pack years (packs per day multiplied by number of years you smoked); (5) You get a written order from a healthcare provider.  Glaucoma Screening: covered annually if you're considered high risk: (1) You have diabetes OR (2) Family history of glaucoma OR (3)  aged 50 and older OR (4)  American aged 65 and older  Osteoporosis Screening: covered every 2 years if you meet one of the following conditions: (1) You're estrogen deficient and at risk for osteoporosis based off medical history and other findings; (2) Have a vertebral abnormality; (3) On glucocorticoid therapy for more than 3 months; (4) Have primary hyperparathyroidism; (5) On osteoporosis medications and need to assess response to drug therapy.   Last bone density test (DXA Scan): Not on file.  HIV Screening: covered annually if you're between the age of 15-65. Also covered annually if you are younger than 15 and older than 65 with risk factors for HIV infection. For pregnant patients, it is covered up to 3 times per pregnancy.    Immunizations:  Immunization Recommendations   Influenza Vaccine Annual influenza vaccination during flu season is  recommended for all persons aged >= 6 months who do not have contraindications   Pneumococcal Vaccine   * Pneumococcal conjugate vaccine = PCV13 (Prevnar 13), PCV15 (Vaxneuvance), PCV20 (Prevnar 20)  * Pneumococcal polysaccharide vaccine = PPSV23 (Pneumovax) Adults 19-63 yo with certain risk factors or if 65+ yo  If never received any pneumonia vaccine: recommend Prevnar 20 (PCV20)  Give PCV20 if previously received 1 dose of PCV13 or PPSV23   Hepatitis B Vaccine 3 dose series if at intermediate or high risk (ex: diabetes, end stage renal disease, liver disease)   Respiratory syncytial virus (RSV) Vaccine - COVERED BY MEDICARE PART D  * RSVPreF3 (Arexvy) CDC recommends that adults 60 years of age and older may receive a single dose of RSV vaccine using shared clinical decision-making (SCDM)   Tetanus (Td) Vaccine - COST NOT COVERED BY MEDICARE PART B Following completion of primary series, a booster dose should be given every 10 years to maintain immunity against tetanus. Td may also be given as tetanus wound prophylaxis.   Tdap Vaccine - COST NOT COVERED BY MEDICARE PART B Recommended at least once for all adults. For pregnant patients, recommended with each pregnancy.   Shingles Vaccine (Shingrix) - COST NOT COVERED BY MEDICARE PART B  2 shot series recommended in those 19 years and older who have or will have weakened immune systems or those 50 years and older     Health Maintenance Due:      Topic Date Due   • HIV Screening  Never done   • Colorectal Cancer Screening  Never done   • Breast Cancer Screening: Mammogram  03/21/2025   • Cervical Cancer Screening  03/16/2028   • Hepatitis C Screening  Completed     Immunizations Due:      Topic Date Due   • Pneumococcal Vaccine: Pediatrics (0 to 5 Years) and At-Risk Patients (6 to 64 Years) (2 of 2 - PCV) 02/01/2014   • Influenza Vaccine (1) 09/01/2024   • COVID-19 Vaccine (6 - 2024-25 season) 09/01/2024     Advance Directives   What are advance directives?  Advance  directives are legal documents that state your wishes and plans for medical care. These plans are made ahead of time in case you lose your ability to make decisions for yourself. Advance directives can apply to any medical decision, such as the treatments you want, and if you want to donate organs.   What are the types of advance directives?  There are many types of advance directives, and each state has rules about how to use them. You may choose a combination of any of the following:  Living will:  This is a written record of the treatment you want. You can also choose which treatments you do not want, which to limit, and which to stop at a certain time. This includes surgery, medicine, IV fluid, and tube feedings.   Durable power of  for healthcare (DPAHC):  This is a written record that states who you want to make healthcare choices for you when you are unable to make them for yourself. This person, called a proxy, is usually a family member or a friend. You may choose more than 1 proxy.  Do not resuscitate (DNR) order:  A DNR order is used in case your heart stops beating or you stop breathing. It is a request not to have certain forms of treatment, such as CPR. A DNR order may be included in other types of advance directives.  Medical directive:  This covers the care that you want if you are in a coma, near death, or unable to make decisions for yourself. You can list the treatments you want for each condition. Treatment may include pain medicine, surgery, blood transfusions, dialysis, IV or tube feedings, and a ventilator (breathing machine).  Values history:  This document has questions about your views, beliefs, and how you feel and think about life. This information can help others choose the care that you would choose.  Why are advance directives important?  An advance directive helps you control your care. Although spoken wishes may be used, it is better to have your wishes written down. Spoken  wishes can be misunderstood, or not followed. Treatments may be given even if you do not want them. An advance directive may make it easier for your family to make difficult choices about your care.   Weight Management   Why it is important to manage your weight:  Being overweight increases your risk of health conditions such as heart disease, high blood pressure, type 2 diabetes, and certain types of cancer. It can also increase your risk for osteoarthritis, sleep apnea, and other respiratory problems. Aim for a slow, steady weight loss. Even a small amount of weight loss can lower your risk of health problems.  How to lose weight safely:  A safe and healthy way to lose weight is to eat fewer calories and get regular exercise. You can lose up about 1 pound a week by decreasing the number of calories you eat by 500 calories each day.   Healthy meal plan for weight management:  A healthy meal plan includes a variety of foods, contains fewer calories, and helps you stay healthy. A healthy meal plan includes the following:  Eat whole-grain foods more often.  A healthy meal plan should contain fiber. Fiber is the part of grains, fruits, and vegetables that is not broken down by your body. Whole-grain foods are healthy and provide extra fiber in your diet. Some examples of whole-grain foods are whole-wheat breads and pastas, oatmeal, brown rice, and bulgur.  Eat a variety of vegetables every day.  Include dark, leafy greens such as spinach, kale, ernestina greens, and mustard greens. Eat yellow and orange vegetables such as carrots, sweet potatoes, and winter squash.   Eat a variety of fruits every day.  Choose fresh or canned fruit (canned in its own juice or light syrup) instead of juice. Fruit juice has very little or no fiber.  Eat low-fat dairy foods.  Drink fat-free (skim) milk or 1% milk. Eat fat-free yogurt and low-fat cottage cheese. Try low-fat cheeses such as mozzarella and other reduced-fat cheeses.  Choose  meat and other protein foods that are low in fat.  Choose beans or other legumes such as split peas or lentils. Choose fish, skinless poultry (chicken or turkey), or lean cuts of red meat (beef or pork). Before you cook meat or poultry, cut off any visible fat.   Use less fat and oil.  Try baking foods instead of frying them. Add less fat, such as margarine, sour cream, regular salad dressing and mayonnaise to foods. Eat fewer high-fat foods. Some examples of high-fat foods include french fries, doughnuts, ice cream, and cakes.  Eat fewer sweets.  Limit foods and drinks that are high in sugar. This includes candy, cookies, regular soda, and sweetened drinks.  Exercise:  Exercise at least 30 minutes per day on most days of the week. Some examples of exercise include walking, biking, dancing, and swimming. You can also fit in more physical activity by taking the stairs instead of the elevator or parking farther away from stores. Ask your healthcare provider about the best exercise plan for you.      © Copyright [employer] 2018 Information is for End User's use only and may not be sold, redistributed or otherwise used for commercial purposes. All illustrations and images included in CareNotes® are the copyrighted property of [employer].D.A.M., Inc. or [employer]

## 2025-02-04 NOTE — ASSESSMENT & PLAN NOTE
Well-controlled on Ozempic 1mg weekly.   Encourage low carb diet and exercise.  Will continue to monitor fasting glucose and A1C.  Lab Results   Component Value Date    HGBA1C 6.3 (H) 01/11/2025

## 2025-02-05 NOTE — ASSESSMENT & PLAN NOTE
Patient presents today with caregiver. Immunizations, diet, exercise and safety measures were discussed.

## 2025-02-05 NOTE — ASSESSMENT & PLAN NOTE
Orders:  •  PARoxetine (PAXIL) 10 mg tablet; 1 tablet daily for 1 month, then half tablet daily for 1 month then half tablet every other day for 1 month then stop

## 2025-02-05 NOTE — ASSESSMENT & PLAN NOTE
The patient has been experiencing increased constipation as noted in the HPI. Likely secondary to Ozempic use. Some symptom relief noted on Colace 50 mg twice daily. Will send order for Colace 100 mg twice daily for further treatment. Patient and caregiver instructed to call the office if symptoms worsen or fail to improve.   Orders:  •  docusate sodium (COLACE) 100 mg capsule; Take 1 capsule (100 mg total) by mouth 2 (two) times a day

## 2025-02-05 NOTE — ASSESSMENT & PLAN NOTE
Orders:  •  docusate sodium (COLACE) 100 mg capsule; Take 1 capsule (100 mg total) by mouth 2 (two) times a day

## 2025-02-05 NOTE — ASSESSMENT & PLAN NOTE
Chronic rhinorrhea noted during today's exam. Limited relief with Zyrtec and Flonase use. ENT appointment scheduled for 2/18/2025. Will prescribe Astelin for treatment until she can follow-up with ENT.  Orders:  •  azelastine (ASTELIN) 0.1 % nasal spray; 1 spray into each nostril 2 (two) times a day Use in each nostril as directed

## 2025-02-05 NOTE — ASSESSMENT & PLAN NOTE
Patient presents today with caregiver. Immunizations, diet, exercise and safety measures were discussed

## 2025-02-05 NOTE — ASSESSMENT & PLAN NOTE
Orders:  •  azelastine (ASTELIN) 0.1 % nasal spray; 1 spray into each nostril 2 (two) times a day Use in each nostril as directed

## 2025-02-18 DIAGNOSIS — I10 ESSENTIAL HYPERTENSION: ICD-10-CM

## 2025-02-18 DIAGNOSIS — E55.9 VITAMIN D INSUFFICIENCY: ICD-10-CM

## 2025-02-18 RX ORDER — LISINOPRIL 5 MG/1
5 TABLET ORAL DAILY
Qty: 90 TABLET | Refills: 1 | Status: SHIPPED | OUTPATIENT
Start: 2025-02-18

## 2025-02-18 NOTE — TELEPHONE ENCOUNTER
Fax from [facility] pharmacy for refill of Lisinopril 5mg  and Calcium 315mg/vitamin D3

## 2025-03-06 PROBLEM — Z00.00 MEDICARE ANNUAL WELLNESS VISIT, SUBSEQUENT: Status: RESOLVED | Noted: 2022-11-22 | Resolved: 2025-03-06

## 2025-03-11 DIAGNOSIS — R09.89 RUNNY NOSE: Primary | ICD-10-CM

## 2025-03-11 NOTE — TELEPHONE ENCOUNTER
Reason for call:   [x] Refill   [] Prior Auth  [] Other:     Office:   [x] PCP/Provider -   [] Specialty/Provider -     Medication: fluticasone (FLONASE) 50 mcg/act nasal spray 2 sprays into each nostril daily       Pharmacy: [facility] - [address] [place] [address] Pharmacy   Does the patient have enough for 3 days?   [x] Yes   [] No - Send as HP to POD    [facility] Away Pharmacy   Does the patient have enough for 10 days?   [] Yes   [] No - Send as HP to POD

## 2025-03-12 RX ORDER — FLUTICASONE PROPIONATE 50 MCG
2 SPRAY, SUSPENSION (ML) NASAL DAILY
Qty: 16 G | Refills: 1 | Status: SHIPPED | OUTPATIENT
Start: 2025-03-12

## 2025-03-31 DIAGNOSIS — E78.49 OTHER HYPERLIPIDEMIA: ICD-10-CM

## 2025-03-31 DIAGNOSIS — E03.8 OTHER SPECIFIED HYPOTHYROIDISM: ICD-10-CM

## 2025-03-31 DIAGNOSIS — I10 ESSENTIAL HYPERTENSION: ICD-10-CM

## 2025-03-31 DIAGNOSIS — E55.9 VITAMIN D INSUFFICIENCY: ICD-10-CM

## 2025-03-31 DIAGNOSIS — E11.69 TYPE 2 DIABETES MELLITUS WITH OTHER SPECIFIED COMPLICATION, WITHOUT LONG-TERM CURRENT USE OF INSULIN (HCC): ICD-10-CM

## 2025-03-31 NOTE — TELEPHONE ENCOUNTER
Reason for call:   [x] Refill   [] Prior Auth  [] Other:     Office:   [x] PCP/Provider - [facility]  Authorized By: Dayron Oquendo MD    [] Specialty/Provider -     Medication: Ozempic, 1 MG/DOSE, 4 MG/3ML injection pen    Dose/Frequency: INJECT 0.75ML (1MG TOTAL) UNDER THE SKIN ONCE A WEEK FOR DIABETES    Quantity: 9 mL    Pharmacy: [address] [facility]   Does the patient have enough for 3 days?   [x] Yes   [] No - Send as HP to POD    [facility] Pharm[facility]   Does the patient have enough for 10 days?   [] Yes   [] No - Send as HP to POD    Reason for call:   [x] Refill   [] Prior Auth  [] Other:     Office:   [x] PCP/Provider - [facility]  Authorized By: Dayron Oquendo MD    [] Specialty/Provider -     Medication: simvastatin (ZOCOR) 20 mg tablet    Dose/Frequency: Take 1 tablet (20 mg total) by mouth daily at bedtime    Quantity: 100 tablet    Pharmacy: [address] [facility]   Does the patient have enough for 3 days?   [x] Yes   [] No - Send as HP to POD    [facility] Pharm[facility]   Does the patient have enough for 10 days?   [] Yes   [] No - Send as HP to POD    Reason for call:   [x] Refill   [] Prior Auth  [] Other:     Office:   [x] PCP/Provider - [facility]  Authorized By: Dayron Oquendo MD    [] Specialty/Provider -     Medication: ergocalciferol (VITAMIN D2) 50,000 units    Dose/Frequency: TAKE ONE CAPSULE BY MOUTH once weekly on thursday FOR VITAMIN D DEFICIENCY    Quantity: 12 capsule    Pharmacy: [address] [facility]   Does the patient have enough for 3 days?   [x] Yes   [] No - Send as HP to POD    Mail Away Pharmacy   Does the patient have enough for 10 days?   [] Yes   [] No - Send as HP to POD

## 2025-04-01 ENCOUNTER — HOSPITAL ENCOUNTER (OUTPATIENT)
Dept: RADIOLOGY | Facility: IMAGING CENTER | Age: 55
Discharge: HOME | End: 2025-04-01
Payer: COMMERCIAL

## 2025-04-01 ENCOUNTER — TELEPHONE (OUTPATIENT)
Age: 55
End: 2025-04-01

## 2025-04-01 ENCOUNTER — ANNUAL EXAM (OUTPATIENT)
Age: 55
End: 2025-04-01
Payer: COMMERCIAL

## 2025-04-01 VITALS — HEIGHT: 61 IN | WEIGHT: 148 LBS | BODY MASS INDEX: 27.94 KG/M2

## 2025-04-01 VITALS
HEIGHT: 61 IN | SYSTOLIC BLOOD PRESSURE: 118 MMHG | BODY MASS INDEX: 28.09 KG/M2 | WEIGHT: 148.8 LBS | DIASTOLIC BLOOD PRESSURE: 74 MMHG

## 2025-04-01 DIAGNOSIS — R13.10 DYSPHAGIA, UNSPECIFIED TYPE: Primary | ICD-10-CM

## 2025-04-01 DIAGNOSIS — Z01.419 WELL WOMAN EXAM WITH ROUTINE GYNECOLOGICAL EXAM: Primary | ICD-10-CM

## 2025-04-01 DIAGNOSIS — Z12.31 ENCOUNTER FOR SCREENING MAMMOGRAM FOR MALIGNANT NEOPLASM OF BREAST: ICD-10-CM

## 2025-04-01 PROCEDURE — G0101 CA SCREEN;PELVIC/BREAST EXAM: HCPCS | Performed by: OBSTETRICS & GYNECOLOGY

## 2025-04-01 PROCEDURE — 77063 BREAST TOMOSYNTHESIS BI: CPT

## 2025-04-01 PROCEDURE — 77067 SCR MAMMO BI INCL CAD: CPT

## 2025-04-01 RX ORDER — OMEPRAZOLE 20 MG/1
20 CAPSULE, DELAYED RELEASE ORAL
COMMUNITY
Start: 2025-03-17

## 2025-04-01 NOTE — TELEPHONE ENCOUNTER
Received call from patient's CG Cherri stating that patient is receiving Speech Therapy with [facility] and they are requesting an order for:  Speech Therapy with FEES (Fiberoptic endoscopic evaluation of swallowing).    Cherri states that she can print the order out of Epic once it is entered.

## 2025-04-01 NOTE — PROGRESS NOTES
"Subjective      Emelina Lacy is a 54 y.o. female who presents for annual well woman exam. She has an intellectual delay and is here with her caregiver.    GYN:   No vaginal discharge, labial erythema or lesions, dyspareunia.   Postmenopausal    Patient is not sexually active    HPV vaccination status: aged out   Gynecologic surgeries: none    OB:    female       :   No dysuria, urinary frequency or urgency.   No hematuria, flank pain, incontinence.    Breast:   No breast mass, skin changes, dimpling, reddening, nipple retraction.   No breast discharge.   Patient does  have a family history of ovarian cancer in a cousin.    General:    She does feel safe in her home.  Calcium intake encompasses calcium supplement and Vitamin D supplement   ETOH use: none   Tobacco use: none   Recreational drug use: none    Screening:   Cervical cancer: last pap smear in . Results were NILM/HPV neg.   Breast cancer: last mammogram in 3/2024. Results were BIRADS-1.   Colon cancer: last colonoscopy in . Results were suboptimal, rpt in a year..       Review of Systems  Pertinent items are noted in HPI.      Objective      /74 (BP Location: Left arm, Patient Position: Sitting, Cuff Size: Standard)   Ht 5' 1\" (1.549 m)   Wt 67.5 kg (148 lb 12.8 oz)   LMP  (LMP Unknown)   BMI 28.12 kg/m²     Physical Exam  Constitutional:       Appearance: She is well-developed.   Genitourinary:      Right Labia: No rash or tenderness.     Left Labia: No tenderness or rash.     No vaginal discharge or bleeding.      Moderate vaginal atrophy present.       Right Adnexa: not tender and no mass present.     Left Adnexa: not tender and no mass present.     No cervical motion tenderness, friability or lesion.      Uterus is not enlarged or tender.   Breasts:     Right: Normal.      Left: Normal.   Cardiovascular:      Rate and Rhythm: Normal rate and regular rhythm.      Heart sounds: Normal heart sounds. No murmur heard.     No " friction rub. No gallop.   Pulmonary:      Effort: Pulmonary effort is normal. No respiratory distress.      Breath sounds: No wheezing.   Abdominal:      Palpations: Abdomen is soft.      Tenderness: There is no abdominal tenderness.   Musculoskeletal:         General: No tenderness.   Neurological:      Mental Status: She is alert and oriented to person, place, and time.   Vitals reviewed.        Assessment     Emelina Lacy is a 54 y.o.  with normal gynecologic exam.     Plan     Problem List Items Addressed This Visit    None  Visit Diagnoses         Well woman exam with routine gynecological exam    -  Primary    Normal gyn exam  Pap due in

## 2025-04-02 RX ORDER — ERGOCALCIFEROL 1.25 MG/1
50000 CAPSULE, LIQUID FILLED ORAL WEEKLY
Qty: 12 CAPSULE | Refills: 0 | Status: SHIPPED | OUTPATIENT
Start: 2025-04-02

## 2025-04-02 RX ORDER — SIMVASTATIN 20 MG
20 TABLET ORAL
Qty: 100 TABLET | Refills: 1 | Status: SHIPPED | OUTPATIENT
Start: 2025-04-02

## 2025-04-03 ENCOUNTER — RESULTS FOLLOW-UP (OUTPATIENT)
Age: 55
End: 2025-04-03

## 2025-04-03 NOTE — RESULT ENCOUNTER NOTE
Screening mammogram is normal.  Recommend routine screening in one year.  Please inform patient.

## 2025-04-04 ENCOUNTER — TELEPHONE (OUTPATIENT)
Dept: ADMINISTRATIVE | Facility: OTHER | Age: 55
End: 2025-04-04

## 2025-04-04 NOTE — TELEPHONE ENCOUNTER
----- Message from Corie DIEGO sent at 4/3/2025 12:29 PM EDT -----  Regarding: Colonoscopy  04/03/25 12:29 PM    Hello, our patient Emelina Lacy has had CRC: Colonoscopy completed/performed. Please assist in updating the patient chart by pulling the Care Everywhere (CE) document. The date of service is 7/2023.     Thank you,  Corie Chong, RN  [facility]

## 2025-04-04 NOTE — TELEPHONE ENCOUNTER
Upon review of the In Basket request we were able to locate, review, and update the patient chart as requested for CRC: Colonoscopy.    Any additional questions or concerns should be emailed to the Practice Liaisons via the appropriate education email address, please do not reply via In Basket.    Thank you  Mojgan Cervantes MA   [facility]

## 2025-04-08 ENCOUNTER — TELEPHONE (OUTPATIENT)
Age: 55
End: 2025-04-08

## 2025-04-08 NOTE — TELEPHONE ENCOUNTER
Pt recd a positive Covid result today but has no symptoms and her job requires a letter from her doctor clearing her to go back to work. Will pt need to be seen first or can a letter be written for her.    Please advise

## 2025-04-09 DIAGNOSIS — R09.89 RUNNY NOSE: ICD-10-CM

## 2025-04-09 RX ORDER — AZELASTINE 1 MG/ML
1 SPRAY, METERED NASAL 2 TIMES DAILY
Qty: 30 ML | Refills: 1 | Status: SHIPPED | OUTPATIENT
Start: 2025-04-09

## 2025-04-09 NOTE — TELEPHONE ENCOUNTER
Pt caregiver called in warm transfer to [place] at [facility] clinical discussed with care giver and scheduled appointment pt caregiver had no further questions

## 2025-04-09 NOTE — TELEPHONE ENCOUNTER
Pt last seen 2/4/25, has appt 4/11/25. Pharmacy requesting azelastine (ASTELIN) 0.1 % nasal spray.

## 2025-04-11 ENCOUNTER — OFFICE VISIT (OUTPATIENT)
Dept: FAMILY MEDICINE CLINIC | Facility: CLINIC | Age: 55
End: 2025-04-11
Payer: COMMERCIAL

## 2025-04-11 VITALS
DIASTOLIC BLOOD PRESSURE: 84 MMHG | SYSTOLIC BLOOD PRESSURE: 126 MMHG | BODY MASS INDEX: 28.4 KG/M2 | TEMPERATURE: 98.7 F | HEIGHT: 61 IN | OXYGEN SATURATION: 95 % | WEIGHT: 150.4 LBS | HEART RATE: 70 BPM | RESPIRATION RATE: 16 BRPM

## 2025-04-11 DIAGNOSIS — R13.10 DYSPHAGIA, UNSPECIFIED TYPE: ICD-10-CM

## 2025-04-11 DIAGNOSIS — E11.69 TYPE 2 DIABETES MELLITUS WITH OTHER SPECIFIED COMPLICATION, WITHOUT LONG-TERM CURRENT USE OF INSULIN (HCC): ICD-10-CM

## 2025-04-11 DIAGNOSIS — U07.1 COVID-19: Primary | ICD-10-CM

## 2025-04-11 DIAGNOSIS — E78.49 OTHER HYPERLIPIDEMIA: ICD-10-CM

## 2025-04-11 DIAGNOSIS — F79 MENTAL DISABILITY: ICD-10-CM

## 2025-04-11 DIAGNOSIS — E03.8 OTHER SPECIFIED HYPOTHYROIDISM: ICD-10-CM

## 2025-04-11 DIAGNOSIS — I10 ESSENTIAL HYPERTENSION: ICD-10-CM

## 2025-04-11 PROCEDURE — 99214 OFFICE O/P EST MOD 30 MIN: CPT | Performed by: FAMILY MEDICINE

## 2025-04-11 PROCEDURE — G2211 COMPLEX E/M VISIT ADD ON: HCPCS | Performed by: FAMILY MEDICINE

## 2025-04-11 NOTE — ASSESSMENT & PLAN NOTE
Well-controlled.  Continue same.  Will continue to monitor.  Lab Results   Component Value Date    HGBA1C 6.3 (H) 01/11/2025     Orders:  •  Hemoglobin A1C; Future  •  CBC and differential; Future  •  Comprehensive metabolic panel; Future  •  Lipid Panel with Direct LDL reflex; Future  •  TSH, 3rd generation with Free T4 reflex; Future

## 2025-04-11 NOTE — ASSESSMENT & PLAN NOTE
Following with ENT and has went to speech therapist one time.   She will resume speech therapy this week.

## 2025-04-11 NOTE — ASSESSMENT & PLAN NOTE
Currently well-controlled on Lisinopril 10 mg daily.   Continue the same.   Discussed healthy diet and regular exercise.   Will continue to monitor.

## 2025-04-11 NOTE — ASSESSMENT & PLAN NOTE
Currently well-controlled on simvastatin 20 mg daily.   Continue the same.   Discussed low-fat diet and regular exercise.   Will continue to monitor with fasting lipid panel.

## 2025-04-11 NOTE — PROGRESS NOTES
Name: Emelina Lacy      : 1970      MRN: [id number]  Encounter Provider: Dayron Oquendo MD  Encounter Date: 2025   Encounter department: [facility]  :  Assessment & Plan  COVID-19  Tested positive 3/8.   Only symptom has been congestion which is improving.  Discussed hydration and rest.        Dysphagia, unspecified type  Following with ENT and has went to speech therapist one time.   She will resume speech therapy this week.        BMI 28.0-28.9,adult  Currently on Ozempic 1 mg weekly.   Continue the same.   Encourage low-carb diet and exercise.   Will continue to monitor fasting glucose and A1c.        Other hyperlipidemia  Currently well-controlled on simvastatin 20 mg daily.   Continue the same.   Discussed low-fat diet and regular exercise.   Will continue to monitor with fasting lipid panel.        Essential hypertension  Currently well-controlled on Lisinopril 10 mg daily.   Continue the same.   Discussed healthy diet and regular exercise.   Will continue to monitor.        Mental disability  Continue to monitor.       Type 2 diabetes mellitus with other specified complication, without long-term current use of insulin (HCC)  Well-controlled.  Continue same.  Will continue to monitor.  Lab Results   Component Value Date    HGBA1C 6.3 (H) 2025     Orders:  •  Hemoglobin A1C; Future  •  CBC and differential; Future  •  Comprehensive metabolic panel; Future  •  Lipid Panel with Direct LDL reflex; Future  •  TSH, 3rd generation with Free T4 reflex; Future    Other specified hypothyroidism  - Her levothyroxine was adjusted last visit.  We will continue to monitor  - Will continue to monitor TSH and T4.               History of Present Illness   Pt is a 55 y/o female here today for COVID. She tested positive for COVID on 3/8.  Everyone else in her house has had COVID as well. She has had congestion but it is improving. Denies fever, chills, sore throat, cough,  "SOB. She needs a note to return to work. She also needs an order for a new diet. She is currently on a sift diet due to swallowing difficulties.     Diabetes  Pertinent negatives for hypoglycemia include no seizures. Pertinent negatives for diabetes include no chest pain and no fatigue.     Review of Systems   Constitutional:  Negative for chills, fatigue and fever.   HENT:  Positive for congestion. Negative for ear discharge, ear pain and sore throat.    Eyes:  Negative for pain and visual disturbance.   Respiratory:  Negative for cough, shortness of breath and wheezing.    Cardiovascular:  Negative for chest pain and palpitations.   Gastrointestinal:  Negative for abdominal pain and vomiting.   Genitourinary:  Negative for dysuria and hematuria.   Musculoskeletal:  Negative for arthralgias and back pain.   Skin:  Negative for color change and rash.   Neurological:  Negative for seizures and syncope.   All other systems reviewed and are negative.      Objective   /84 (BP Location: Left arm, Patient Position: Sitting, Cuff Size: Large)   Pulse 70   Temp 98.7 °F (37.1 °C) (Tympanic)   Resp 16   Ht 5' 1\" (1.549 m)   Wt 68.2 kg (150 lb 6.4 oz)   LMP  (LMP Unknown)   SpO2 95%   BMI 28.42 kg/m²      Physical Exam  Vitals and nursing note reviewed.   Constitutional:       General: She is not in acute distress.     Appearance: She is well-developed.   HENT:      Head: Normocephalic and atraumatic.   Eyes:      Conjunctiva/sclera: Conjunctivae normal.   Cardiovascular:      Rate and Rhythm: Normal rate and regular rhythm.      Heart sounds: No murmur heard.  Pulmonary:      Effort: Pulmonary effort is normal. No respiratory distress.      Breath sounds: Normal breath sounds.   Abdominal:      Palpations: Abdomen is soft.      Tenderness: There is no abdominal tenderness.   Musculoskeletal:         General: No swelling.      Cervical back: Neck supple.   Skin:     General: Skin is warm and dry.      Capillary " Refill: Capillary refill takes less than 2 seconds.   Neurological:      Mental Status: She is alert.   Psychiatric:         Mood and Affect: Mood normal.

## 2025-04-11 NOTE — ASSESSMENT & PLAN NOTE
Tested positive 3/8.   Only symptom has been congestion which is improving.  Discussed hydration and rest.

## 2025-04-11 NOTE — ASSESSMENT & PLAN NOTE
Currently on Ozempic 1 mg weekly.   Continue the same.   Encourage low-carb diet and exercise.   Will continue to monitor fasting glucose and A1c.

## 2025-04-11 NOTE — ASSESSMENT & PLAN NOTE
- Her levothyroxine was adjusted last visit.  We will continue to monitor  - Will continue to monitor TSH and T4.

## 2025-06-10 DIAGNOSIS — E03.8 OTHER SPECIFIED HYPOTHYROIDISM: ICD-10-CM

## 2025-06-10 RX ORDER — LEVOTHYROXINE SODIUM 75 UG/1
75 TABLET ORAL DAILY
Qty: 90 TABLET | Refills: 1 | Status: SHIPPED | OUTPATIENT
Start: 2025-06-10

## 2025-06-21 DIAGNOSIS — R09.89 RUNNY NOSE: ICD-10-CM

## 2025-06-22 RX ORDER — FLUTICASONE PROPIONATE 50 MCG
SPRAY, SUSPENSION (ML) NASAL
Qty: 16 G | Refills: 1 | Status: SHIPPED | OUTPATIENT
Start: 2025-06-22

## 2025-07-07 DIAGNOSIS — E03.8 OTHER SPECIFIED HYPOTHYROIDISM: ICD-10-CM

## 2025-07-07 DIAGNOSIS — E55.9 VITAMIN D INSUFFICIENCY: ICD-10-CM

## 2025-07-07 DIAGNOSIS — K59.03 DRUG-INDUCED CONSTIPATION: ICD-10-CM

## 2025-07-07 DIAGNOSIS — E78.49 OTHER HYPERLIPIDEMIA: ICD-10-CM

## 2025-07-07 DIAGNOSIS — R09.89 RUNNY NOSE: ICD-10-CM

## 2025-07-07 DIAGNOSIS — I10 ESSENTIAL HYPERTENSION: ICD-10-CM

## 2025-07-07 RX ORDER — BUTALB/ACETAMINOPHEN/CAFFEINE 50-325-40
1 TABLET ORAL DAILY
Qty: 90 TABLET | Refills: 0 | Status: SHIPPED | OUTPATIENT
Start: 2025-07-07 | End: 2025-07-15 | Stop reason: SDUPTHER

## 2025-07-07 RX ORDER — AZELASTINE 1 MG/ML
1 SPRAY, METERED NASAL 2 TIMES DAILY
Qty: 30 ML | Refills: 1 | Status: SHIPPED | OUTPATIENT
Start: 2025-07-07 | End: 2025-07-15

## 2025-07-07 RX ORDER — LISINOPRIL 5 MG/1
5 TABLET ORAL DAILY
Qty: 90 TABLET | Refills: 0 | Status: SHIPPED | OUTPATIENT
Start: 2025-07-07 | End: 2025-07-15 | Stop reason: SDUPTHER

## 2025-07-07 RX ORDER — LEVOTHYROXINE SODIUM 75 UG/1
75 TABLET ORAL DAILY
Qty: 90 TABLET | Refills: 0 | Status: SHIPPED | OUTPATIENT
Start: 2025-07-07 | End: 2025-07-15 | Stop reason: SDUPTHER

## 2025-07-07 RX ORDER — ERGOCALCIFEROL 1.25 MG/1
50000 CAPSULE, LIQUID FILLED ORAL WEEKLY
Qty: 12 CAPSULE | Refills: 0 | Status: SHIPPED | OUTPATIENT
Start: 2025-07-07

## 2025-07-07 RX ORDER — FLUTICASONE PROPIONATE 50 MCG
2 SPRAY, SUSPENSION (ML) NASAL DAILY
Qty: 16 G | Refills: 1 | Status: SHIPPED | OUTPATIENT
Start: 2025-07-07 | End: 2025-07-15

## 2025-07-07 RX ORDER — DOCUSATE SODIUM 100 MG/1
100 CAPSULE, LIQUID FILLED ORAL 2 TIMES DAILY
Qty: 60 CAPSULE | Refills: 5 | Status: SHIPPED | OUTPATIENT
Start: 2025-07-07

## 2025-07-07 RX ORDER — SIMVASTATIN 20 MG
20 TABLET ORAL
Qty: 90 TABLET | Refills: 0 | Status: SHIPPED | OUTPATIENT
Start: 2025-07-07 | End: 2025-07-15 | Stop reason: SDUPTHER

## 2025-07-07 RX ORDER — OMEPRAZOLE 20 MG/1
20 CAPSULE, DELAYED RELEASE ORAL DAILY
Qty: 90 CAPSULE | Refills: 0 | Status: SHIPPED | OUTPATIENT
Start: 2025-07-07 | End: 2025-07-15 | Stop reason: SDUPTHER

## 2025-07-07 NOTE — TELEPHONE ENCOUNTER
Fax from [place] [facility] for refill of    Omeprazole 20mg  Docusate 100mg  Azelastine nasal spray  Vitamin D 50,000  Ozempic inj 4mg  Levothyroxine 75mcg  Calcium/vitamin D  Fluticasone spray  Lisinopril 5mg  Simvastatin 20mg

## 2025-07-09 DIAGNOSIS — E03.8 OTHER SPECIFIED HYPOTHYROIDISM: ICD-10-CM

## 2025-07-09 DIAGNOSIS — E55.9 VITAMIN D INSUFFICIENCY: ICD-10-CM

## 2025-07-09 DIAGNOSIS — I10 ESSENTIAL HYPERTENSION: ICD-10-CM

## 2025-07-09 DIAGNOSIS — R09.89 RUNNY NOSE: ICD-10-CM

## 2025-07-09 DIAGNOSIS — L30.9 DERMATITIS: ICD-10-CM

## 2025-07-09 DIAGNOSIS — K59.03 DRUG-INDUCED CONSTIPATION: ICD-10-CM

## 2025-07-09 DIAGNOSIS — E78.49 OTHER HYPERLIPIDEMIA: ICD-10-CM

## 2025-07-09 DIAGNOSIS — E11.69 TYPE 2 DIABETES MELLITUS WITH OTHER SPECIFIED COMPLICATION, WITHOUT LONG-TERM CURRENT USE OF INSULIN (HCC): ICD-10-CM

## 2025-07-09 NOTE — TELEPHONE ENCOUNTER
Reason for call:   [x] Refill   [] Prior Auth  [] Other:     Office:   [x] PCP/Provider - [facility] - Dayron Oquendo MD   [] Specialty/Provider -     Medication: semaglutide, 1 mg/dose, (Ozempic, 1 MG/DOSE,) 4 mg/3 mL injection pen     Dose/Frequency: Inject 0.75 mL (1 mg total) under the skin every 7 days     Quantity: 9 mL    Medication:  simvastatin (ZOCOR) 20 mg tablet    Dose/Frequency: Take 1 tablet (20 mg total) by mouth daily at bedtime     Quantity:  90 tablet     Medication: lisinopril (ZESTRIL) 5 mg tablet    Dose/Frequency: Take 1 tablet (5 mg total) by mouth daily     Quantity: 90 tablet    Medication:  clotrimazole-betamethasone (LOTRISONE) 1-0.05 % cream    Dose/Frequency:  Apply topically 2 (two) times a day as needed (rash)     Quantity: 45 g    Medication:  docusate sodium (COLACE) 100 mg capsule    Dose/Frequency: Take 1 capsule (100 mg total) by mouth 2 (two) times a day,     Quantity: 60 capsule     Medication:  calcium citrate-vitamin D 315 mg-5 mcg tablet    Dose/Frequency: Take 1 tablet by mouth daily     Quantity: 90 tablet    Medication:  azelastine (ASTELIN) 0.1 % nasal spray    Dose/Frequency:  1 spray into each nostril 2 (two) times a day Use in each nostril as directed     Quantity: 30 mL    Medication:  levothyroxine 75 mcg tablet    Dose/Frequency: Take 1 tablet (75 mcg total) by mouth daily     Quantity: 90 tablet    Medication:  omeprazole (PriLOSEC) 20 mg delayed release capsule    Dose/Frequency: Take 1 capsule (20 mg total) by mouth daily     Quantity: 90 capsule    Medication: ergocalciferol (VITAMIN D2) 50,000 units     Dose/Frequency: Take 1 capsule (50,000 Units total) by mouth once a week     Quantity: 12 capsule    Pharmacy: [facility] [address] [phone]    Local Pharmacy   Does the patient have enough for 3 days?   [x] Yes   [] No - Send as HP to POD    Mail Away Pharmacy   Does the patient have enough for  10 days?   [] Yes   [] No - Send as HP to POD

## 2025-07-11 ENCOUNTER — APPOINTMENT (OUTPATIENT)
Dept: LAB | Facility: HOSPITAL | Age: 55
End: 2025-07-11
Payer: COMMERCIAL

## 2025-07-11 DIAGNOSIS — E11.69 TYPE 2 DIABETES MELLITUS WITH OTHER SPECIFIED COMPLICATION, WITHOUT LONG-TERM CURRENT USE OF INSULIN (HCC): ICD-10-CM

## 2025-07-11 LAB
ALBUMIN SERPL BCG-MCNC: 4.3 G/DL (ref 3.5–5)
ALP SERPL-CCNC: 99 U/L (ref 34–104)
ALT SERPL W P-5'-P-CCNC: 26 U/L (ref 7–52)
ANION GAP SERPL CALCULATED.3IONS-SCNC: 6 MMOL/L (ref 4–13)
AST SERPL W P-5'-P-CCNC: 23 U/L (ref 13–39)
BASOPHILS # BLD AUTO: 0.03 THOUSANDS/ÂΜL (ref 0–0.1)
BASOPHILS NFR BLD AUTO: 0 % (ref 0–1)
BILIRUB SERPL-MCNC: 0.66 MG/DL (ref 0.2–1)
BUN SERPL-MCNC: 14 MG/DL (ref 5–25)
CALCIUM SERPL-MCNC: 8.4 MG/DL (ref 8.4–10.2)
CHLORIDE SERPL-SCNC: 102 MMOL/L (ref 96–108)
CHOLEST SERPL-MCNC: 126 MG/DL (ref ?–200)
CO2 SERPL-SCNC: 30 MMOL/L (ref 21–32)
CREAT SERPL-MCNC: 0.76 MG/DL (ref 0.6–1.3)
EOSINOPHIL # BLD AUTO: 0.15 THOUSAND/ÂΜL (ref 0–0.61)
EOSINOPHIL NFR BLD AUTO: 2 % (ref 0–6)
ERYTHROCYTE [DISTWIDTH] IN BLOOD BY AUTOMATED COUNT: 13.8 % (ref 11.6–15.1)
EST. AVERAGE GLUCOSE BLD GHB EST-MCNC: 137 MG/DL
GFR SERPL CREATININE-BSD FRML MDRD: 88 ML/MIN/1.73SQ M
GLUCOSE P FAST SERPL-MCNC: 109 MG/DL (ref 65–99)
HBA1C MFR BLD: 6.4 %
HCT VFR BLD AUTO: 38.4 % (ref 34.8–46.1)
HDLC SERPL-MCNC: 48 MG/DL
HGB BLD-MCNC: 12.8 G/DL (ref 11.5–15.4)
IMM GRANULOCYTES # BLD AUTO: 0.02 THOUSAND/UL (ref 0–0.2)
IMM GRANULOCYTES NFR BLD AUTO: 0 % (ref 0–2)
LDLC SERPL CALC-MCNC: 64 MG/DL (ref 0–100)
LYMPHOCYTES # BLD AUTO: 2.34 THOUSANDS/ÂΜL (ref 0.6–4.47)
LYMPHOCYTES NFR BLD AUTO: 30 % (ref 14–44)
MCH RBC QN AUTO: 29.2 PG (ref 26.8–34.3)
MCHC RBC AUTO-ENTMCNC: 33.3 G/DL (ref 31.4–37.4)
MCV RBC AUTO: 88 FL (ref 82–98)
MONOCYTES # BLD AUTO: 0.49 THOUSAND/ÂΜL (ref 0.17–1.22)
MONOCYTES NFR BLD AUTO: 6 % (ref 4–12)
NEUTROPHILS # BLD AUTO: 4.86 THOUSANDS/ÂΜL (ref 1.85–7.62)
NEUTS SEG NFR BLD AUTO: 62 % (ref 43–75)
NRBC BLD AUTO-RTO: 0 /100 WBCS
PLATELET # BLD AUTO: 198 THOUSANDS/UL (ref 149–390)
PMV BLD AUTO: 10.8 FL (ref 8.9–12.7)
POTASSIUM SERPL-SCNC: 4.3 MMOL/L (ref 3.5–5.3)
PROT SERPL-MCNC: 8.1 G/DL (ref 6.4–8.4)
RBC # BLD AUTO: 4.39 MILLION/UL (ref 3.81–5.12)
SODIUM SERPL-SCNC: 138 MMOL/L (ref 135–147)
TRIGL SERPL-MCNC: 70 MG/DL (ref ?–150)
TSH SERPL DL<=0.05 MIU/L-ACNC: 2.62 UIU/ML (ref 0.45–4.5)
WBC # BLD AUTO: 7.89 THOUSAND/UL (ref 4.31–10.16)

## 2025-07-11 PROCEDURE — 85025 COMPLETE CBC W/AUTO DIFF WBC: CPT

## 2025-07-11 PROCEDURE — 80061 LIPID PANEL: CPT

## 2025-07-11 PROCEDURE — 84443 ASSAY THYROID STIM HORMONE: CPT

## 2025-07-11 PROCEDURE — 36415 COLL VENOUS BLD VENIPUNCTURE: CPT

## 2025-07-11 PROCEDURE — 80053 COMPREHEN METABOLIC PANEL: CPT

## 2025-07-11 PROCEDURE — 83036 HEMOGLOBIN GLYCOSYLATED A1C: CPT

## 2025-07-11 RX ORDER — CLOTRIMAZOLE AND BETAMETHASONE DIPROPIONATE 10; .64 MG/G; MG/G
CREAM TOPICAL 2 TIMES DAILY PRN
Qty: 45 G | Refills: 2 | Status: SHIPPED | OUTPATIENT
Start: 2025-07-11

## 2025-07-11 RX ORDER — SIMVASTATIN 20 MG
20 TABLET ORAL
Qty: 90 TABLET | Refills: 1 | OUTPATIENT
Start: 2025-07-11

## 2025-07-11 RX ORDER — LISINOPRIL 5 MG/1
5 TABLET ORAL DAILY
Qty: 90 TABLET | Refills: 1 | OUTPATIENT
Start: 2025-07-11

## 2025-07-11 RX ORDER — DOCUSATE SODIUM 100 MG/1
100 CAPSULE, LIQUID FILLED ORAL 2 TIMES DAILY
Qty: 60 CAPSULE | Refills: 5 | OUTPATIENT
Start: 2025-07-11

## 2025-07-11 RX ORDER — LEVOTHYROXINE SODIUM 75 UG/1
75 TABLET ORAL DAILY
Qty: 90 TABLET | Refills: 1 | OUTPATIENT
Start: 2025-07-11

## 2025-07-11 RX ORDER — AZELASTINE 1 MG/ML
1 SPRAY, METERED NASAL 2 TIMES DAILY
Qty: 30 ML | Refills: 1 | OUTPATIENT
Start: 2025-07-11

## 2025-07-11 RX ORDER — OMEPRAZOLE 20 MG/1
20 CAPSULE, DELAYED RELEASE ORAL DAILY
Qty: 90 CAPSULE | Refills: 1 | OUTPATIENT
Start: 2025-07-11

## 2025-07-11 RX ORDER — ERGOCALCIFEROL 1.25 MG/1
50000 CAPSULE, LIQUID FILLED ORAL WEEKLY
Qty: 12 CAPSULE | Refills: 0 | OUTPATIENT
Start: 2025-07-11

## 2025-07-11 RX ORDER — BUTALB/ACETAMINOPHEN/CAFFEINE 50-325-40
1 TABLET ORAL DAILY
Qty: 90 TABLET | Refills: 1 | OUTPATIENT
Start: 2025-07-11

## 2025-07-14 DIAGNOSIS — E55.9 VITAMIN D INSUFFICIENCY: ICD-10-CM

## 2025-07-14 DIAGNOSIS — K59.03 DRUG-INDUCED CONSTIPATION: ICD-10-CM

## 2025-07-15 ENCOUNTER — OFFICE VISIT (OUTPATIENT)
Dept: FAMILY MEDICINE CLINIC | Facility: CLINIC | Age: 55
End: 2025-07-15
Payer: COMMERCIAL

## 2025-07-15 VITALS
SYSTOLIC BLOOD PRESSURE: 110 MMHG | OXYGEN SATURATION: 98 % | TEMPERATURE: 98.4 F | HEART RATE: 88 BPM | WEIGHT: 147 LBS | RESPIRATION RATE: 16 BRPM | HEIGHT: 61 IN | BODY MASS INDEX: 27.75 KG/M2 | DIASTOLIC BLOOD PRESSURE: 64 MMHG

## 2025-07-15 DIAGNOSIS — I10 ESSENTIAL HYPERTENSION: ICD-10-CM

## 2025-07-15 DIAGNOSIS — R09.89 RUNNY NOSE: ICD-10-CM

## 2025-07-15 DIAGNOSIS — E03.8 OTHER SPECIFIED HYPOTHYROIDISM: ICD-10-CM

## 2025-07-15 DIAGNOSIS — I10 ESSENTIAL HYPERTENSION: Primary | ICD-10-CM

## 2025-07-15 DIAGNOSIS — E11.69 TYPE 2 DIABETES MELLITUS WITH OTHER SPECIFIED COMPLICATION, WITHOUT LONG-TERM CURRENT USE OF INSULIN (HCC): ICD-10-CM

## 2025-07-15 DIAGNOSIS — K59.03 DRUG-INDUCED CONSTIPATION: ICD-10-CM

## 2025-07-15 DIAGNOSIS — E78.49 OTHER HYPERLIPIDEMIA: ICD-10-CM

## 2025-07-15 DIAGNOSIS — E55.9 VITAMIN D INSUFFICIENCY: ICD-10-CM

## 2025-07-15 PROCEDURE — 99214 OFFICE O/P EST MOD 30 MIN: CPT | Performed by: FAMILY MEDICINE

## 2025-07-15 PROCEDURE — G2211 COMPLEX E/M VISIT ADD ON: HCPCS | Performed by: FAMILY MEDICINE

## 2025-07-15 RX ORDER — SIMVASTATIN 20 MG
20 TABLET ORAL
Qty: 90 TABLET | Refills: 1 | Status: SHIPPED | OUTPATIENT
Start: 2025-07-15

## 2025-07-15 RX ORDER — FLUTICASONE PROPIONATE 50 MCG
2 SPRAY, SUSPENSION (ML) NASAL DAILY
Qty: 16 G | Refills: 1 | OUTPATIENT
Start: 2025-07-15

## 2025-07-15 RX ORDER — OMEPRAZOLE 20 MG/1
20 CAPSULE, DELAYED RELEASE ORAL DAILY
Qty: 90 CAPSULE | Refills: 1 | Status: SHIPPED | OUTPATIENT
Start: 2025-07-15

## 2025-07-15 RX ORDER — DOCUSATE SODIUM 100 MG/1
100 CAPSULE, LIQUID FILLED ORAL 2 TIMES DAILY
Qty: 60 CAPSULE | Refills: 5 | OUTPATIENT
Start: 2025-07-15

## 2025-07-15 RX ORDER — LISINOPRIL 5 MG/1
5 TABLET ORAL DAILY
Qty: 90 TABLET | Refills: 1 | Status: SHIPPED | OUTPATIENT
Start: 2025-07-15

## 2025-07-15 RX ORDER — BUTALB/ACETAMINOPHEN/CAFFEINE 50-325-40
1 TABLET ORAL DAILY
Qty: 90 TABLET | Refills: 1 | Status: SHIPPED | OUTPATIENT
Start: 2025-07-15

## 2025-07-15 RX ORDER — AZELASTINE 1 MG/ML
1 SPRAY, METERED NASAL 2 TIMES DAILY
Qty: 30 ML | Refills: 1 | OUTPATIENT
Start: 2025-07-15

## 2025-07-15 RX ORDER — LEVOTHYROXINE SODIUM 75 UG/1
75 TABLET ORAL DAILY
Qty: 90 TABLET | Refills: 1 | Status: SHIPPED | OUTPATIENT
Start: 2025-07-15

## 2025-07-16 ENCOUNTER — TELEPHONE (OUTPATIENT)
Age: 55
End: 2025-07-16

## 2025-07-16 RX ORDER — DOCUSATE SODIUM 100 MG/1
CAPSULE, LIQUID FILLED ORAL
Qty: 60 CAPSULE | Refills: 5 | OUTPATIENT
Start: 2025-07-16

## 2025-07-16 RX ORDER — ERGOCALCIFEROL 1.25 MG/1
CAPSULE, LIQUID FILLED ORAL
Qty: 12 CAPSULE | Refills: 0 | OUTPATIENT
Start: 2025-07-16

## 2025-07-16 RX ORDER — ERGOCALCIFEROL 1.25 MG/1
50000 CAPSULE, LIQUID FILLED ORAL WEEKLY
Qty: 12 CAPSULE | Refills: 0 | OUTPATIENT
Start: 2025-07-16

## 2025-07-16 NOTE — TELEPHONE ENCOUNTER
[facility] called to confirm that azelastine (ASTELIN) 0.1 % nasal spray & fluticasone (FLONASE) 50 mcg/act nasal spray were discontinued. I confirmed there are no longer on patient's current medication list.

## 2025-07-28 DIAGNOSIS — E55.9 VITAMIN D INSUFFICIENCY: ICD-10-CM
